# Patient Record
Sex: FEMALE | Race: WHITE | NOT HISPANIC OR LATINO | Employment: UNEMPLOYED | URBAN - METROPOLITAN AREA
[De-identification: names, ages, dates, MRNs, and addresses within clinical notes are randomized per-mention and may not be internally consistent; named-entity substitution may affect disease eponyms.]

---

## 2021-10-13 ENCOUNTER — TELEPHONE (OUTPATIENT)
Dept: SCHEDULING | Facility: IMAGING CENTER | Age: 66
End: 2021-10-13

## 2021-10-18 ENCOUNTER — OFFICE VISIT (OUTPATIENT)
Dept: MEDICAL GROUP | Facility: LAB | Age: 66
End: 2021-10-18
Payer: COMMERCIAL

## 2021-10-18 ENCOUNTER — HOSPITAL ENCOUNTER (OUTPATIENT)
Facility: MEDICAL CENTER | Age: 66
End: 2021-10-18
Attending: FAMILY MEDICINE
Payer: COMMERCIAL

## 2021-10-18 VITALS
RESPIRATION RATE: 12 BRPM | HEART RATE: 94 BPM | WEIGHT: 169 LBS | DIASTOLIC BLOOD PRESSURE: 86 MMHG | OXYGEN SATURATION: 100 % | TEMPERATURE: 97 F | HEIGHT: 66 IN | SYSTOLIC BLOOD PRESSURE: 146 MMHG | BODY MASS INDEX: 27.16 KG/M2

## 2021-10-18 DIAGNOSIS — Z12.11 COLON CANCER SCREENING: ICD-10-CM

## 2021-10-18 DIAGNOSIS — J01.30 SUBACUTE SPHENOIDAL SINUSITIS: ICD-10-CM

## 2021-10-18 DIAGNOSIS — Z11.59 NEED FOR HEPATITIS C SCREENING TEST: ICD-10-CM

## 2021-10-18 DIAGNOSIS — Z12.31 ENCOUNTER FOR SCREENING MAMMOGRAM FOR MALIGNANT NEOPLASM OF BREAST: ICD-10-CM

## 2021-10-18 DIAGNOSIS — Z13.6 ENCOUNTER FOR SCREENING FOR CARDIOVASCULAR DISORDERS: ICD-10-CM

## 2021-10-18 DIAGNOSIS — Z12.4 SCREENING FOR CERVICAL CANCER: ICD-10-CM

## 2021-10-18 DIAGNOSIS — H40.89 OTHER GLAUCOMA OF BOTH EYES: ICD-10-CM

## 2021-10-18 DIAGNOSIS — E61.1 IRON DEFICIENCY: ICD-10-CM

## 2021-10-18 PROBLEM — R03.0 ELEVATED BP WITHOUT DIAGNOSIS OF HYPERTENSION: Status: ACTIVE | Noted: 2019-10-02

## 2021-10-18 PROCEDURE — 88175 CYTOPATH C/V AUTO FLUID REDO: CPT

## 2021-10-18 PROCEDURE — 99386 PREV VISIT NEW AGE 40-64: CPT | Performed by: FAMILY MEDICINE

## 2021-10-18 PROCEDURE — 87624 HPV HI-RISK TYP POOLED RSLT: CPT

## 2021-10-18 RX ORDER — DOXYCYCLINE HYCLATE 100 MG
100 TABLET ORAL 2 TIMES DAILY
Qty: 20 TABLET | Refills: 0 | Status: SHIPPED | OUTPATIENT
Start: 2021-10-18 | End: 2021-10-28

## 2021-10-18 RX ORDER — FERROUS GLUCONATE 324(38)MG
324 TABLET ORAL
COMMUNITY
End: 2023-08-29

## 2021-10-18 RX ORDER — LORATADINE 10 MG/1
CAPSULE, LIQUID FILLED ORAL
COMMUNITY

## 2021-10-18 RX ORDER — LATANOPROST 50 UG/ML
1 SOLUTION/ DROPS OPHTHALMIC NIGHTLY
COMMUNITY
End: 2023-08-29

## 2021-10-18 ASSESSMENT — PATIENT HEALTH QUESTIONNAIRE - PHQ9: CLINICAL INTERPRETATION OF PHQ2 SCORE: 0

## 2021-10-18 NOTE — LETTER
[facility]  Jazmine Cortez M.D.  [address] [facility] [address]  [place] [address]  Fax: [phone]   Authorization for Release/Disclosure of   Protected Health Information   Name: LIZZ LOPEZ : 1955 SSN: [id number]   Address: [address] [place] [address] [address] [place] [address] Phone:    [phone] (home)    I authorize the entity listed below to release/disclose the PHI below to:   [facility]/Jazmine Cortez M.D. and Jazmine Cortez M.D.   Provider or Entity Name:  [facility]   Address   City, State, Zip   Phone:      Fax:     Reason for request: continuity of care   Information to be released:    [  ] LAST COLONOSCOPY,  including any PATH REPORT and follow-up  [  ] LAST FIT/COLOGUARD RESULT [  ] LAST DEXA  [  ] LAST MAMMOGRAM  [  ] LAST PAP  [  ] LAST LABS [  ] RETINA EXAM REPORT  [  ] IMMUNIZATION RECORDS  [ x ] Release all info      [  ] Check here and initial the line next to each item to release ALL health information INxCLUDING  __x___ Care and treatment for drug and / or alcohol abuse  __x___ HIV testing, infection status, or AIDS  __x___ Genetic Testing    DATES OF SERVICE OR TIME PERIOD TO BE DISCLOSED: _____________  I understand and acknowledge that:  * This Authorization may be revoked at any time by you in writing, except if your health information has already been used or disclosed.  * Your health information that will be used or disclosed as a result of you signing this authorization could be re-disclosed by the recipient. If this occurs, your re-disclosed health information may no longer be protected by State or Federal laws.  * You may refuse to sign this Authorization. Your refusal will not affect your ability to obtain treatment.  * This Authorization becomes effective upon signing and will  on (date) __________.      If no date is indicated, this Authorization will  one (1) year from the signature date.    Name: Lizz  Carol    Signature:   Date:     10/18/2021       PLEASE FAX REQUESTED RECORDS BACK TO: [phone]

## 2021-10-18 NOTE — PROGRESS NOTES
Chief Complaint   Patient presents with   • New Patient     no to flu         Lizz Medina is a 65 y.o. female here to establish care and for evaluation and management of:        HPI:    Has had some elevated BP in the past, without HTN diagnosis.   Not taking meds. Drove in today from [place], a bit nervous in the snow.    is planning on retiring this year, maybe. He is an .     Diet: Pretty good. Cooks a lot, 5 nights a week at least. Maybe some carbs, but not every meal. Does eat dairy. Takes Vit D.   Mom and dad  from complications from diabetes.   Exercise: walking couple miles up and down hills, peloton at home as well. Lives in [place]  Sleep: Generally good. Maybe sinus infection right now. Ear pressure some throat wheezing. Didn't sleep well last night . Takes Claritin -D once daily. H/o vertigo left side in the past, allergy related. Some vertigo last week as well. Some snoring as well of late due to sinus issues    Pap: Long time ago. No abnormals in the past.   Mammo: many years ago. No call backs.   Colonoscopy: Had one at age 50 years old.       Allergies   Allergen Reactions   • Penicillins Rash   • Latex        Current medicines (including changes today)  Current Outpatient Medications   Medication Sig Dispense Refill   • ferrous gluconate (FERGON) 324 (38 Fe) MG Tab Take 324 mg by mouth every morning with breakfast.     • latanoprost (XALATAN) 0.005 % Solution Administer 1 Drop into both eyes every evening.     • vitamin D (VITAMIND D3) 1000 UNIT Tab Take 1,000 Units by mouth every day.     • B Complex Vitamins (B COMPLEX 1 PO) Take 1 Tablet by mouth every day.     • Loratadine (CLARITIN) 10 MG Cap Take  by mouth.       No current facility-administered medications for this visit.     She  has no past medical history on file.  She  has no past surgical history on file.  Social History     Tobacco Use   • Smoking status: Never Smoker   • Smokeless tobacco: Never Used  "  Vaping Use   • Vaping Use: Never used   Substance Use Topics   • Alcohol use: Yes   • Drug use: Never     Social History     Social History Narrative   • Not on file     History reviewed. No pertinent family history.  No family status information on file.         ROS  No fever or chills.  No nausea or vomiting.  No chest pain or palpitations.  No cough or SOB.  No pain with urination or hematuria.  No black or bloody stools.  All other systems reviewed and are negative     Objective:     /86 (BP Location: Left arm, Patient Position: Sitting, BP Cuff Size: Adult)   Pulse 94   Temp 36.1 °C (97 °F)   Resp 12   Ht 1.676 m (5' 6\")   Wt 76.7 kg (169 lb)   SpO2 100%  Body mass index is 27.28 kg/m².  Physical Exam:      Well developed, well nourished.  Alert, oriented in no acute distress.  Psych: Eye contact is good, speech goal directed, affect calm  Eyes: conjunctiva non-injected, sclera non-icteric.  Ears: Pinna normal. TM pearly gray.   Nose: Nares are patent.  Normal mucosa  Mouth: Oral mucous membranes pink and moist with no lesions.  Neck Supple.  No adenopathy or masses in the neck or supraclavicular regions. No thyromegaly  Lungs: clear to auscultation bilaterally with good excursion. No wheezes or rhonchi  CV: regular rate and rhythm. No murmur  Abdomen: soft, nontender, no masses or organomegaly.  No rebound or gaurding  Ext: no edema, color normal, vascularity normal, temperature normal      Assessment and Plan:   The following treatment plan was discussed    1. Encounter for screening mammogram for malignant neoplasm of breast  Mammoordered to get caught up.  - MA-SCREENING MAMMO BILAT W/TOMOSYNTHESIS W/CAD; Future    2. Screening for cervical cancer  We will get Pap with HPV screening.  She is never had any abnormals in the past and so if this is normal this would likely be her last Pap ever.  - THINPREP PAP WITH HPV; Future    3. Colon cancer screening  She is due for colonoscopy patient has " been over 10 years.  We will get referred.  - REFERRAL TO GI FOR COLONOSCOPY    4. Encounter for screening for cardiovascular disorders  Routine screening labs ordered.  - Lipid Profile; Future  - Comp Metabolic Panel; Future    5. Iron deficiency  She does have a history of iron deficiency anemia and does not eat a lot of red meat.  We will go ahead and get some labs regarding this.  - CBC WITH DIFFERENTIAL; Future  - IRON/TOTAL IRON BIND; Future  - FERRITIN; Future    6. Need for hepatitis C screening test    - HCV Scrn ( [id number] [employer]); Future    7. Subacute sphenoidal sinusitis  We will try some doxycycline for subacute sinus infection.  She will send me a message on my chart if this is not helpful over the next couple weeks.      Records requested.        Followup: No follow-ups on file.

## 2021-10-18 NOTE — LETTER
[facility]  Jazmine Cortez M.D.  [address] [facility] [address]  [place] [address]  Fax: [phone]   Authorization for Release/Disclosure of   Protected Health Information   Name: LIZZ MEDINA : 1955 SSN: [id number]   Address: [address] [place] [address] [address] [place] [address] Phone:    [phone] (home)    I authorize the entity listed below to release/disclose the PHI below to:   [facility]/Jazmine Cortez M.D. and Jazmine Cortez M.D.   Provider or Entity Name:  Dr. delarosa   Address   City, [facility], [facility] Phone:      Fax:     Reason for request: continuity of care   Information to be released:    [  ] LAST COLONOSCOPY,  including any PATH REPORT and follow-up  [  ] LAST FIT/COLOGUARD RESULT [  ] LAST DEXA  [  ] LAST MAMMOGRAM  [  ] LAST PAP  [  ] LAST LABS [  ] RETINA EXAM REPORT  [  ] IMMUNIZATION RECORDS  [x  ] Release all info      [  ] Check here and initial the line next to each item to release ALL health information INCLUDING  __xx___ Care and treatment for drug and / or alcohol abuse  __x___ HIV testing, infection status, or AIDS  __x___ Genetic Testing    DATES OF SERVICE OR TIME PERIOD TO BE DISCLOSED: _____________  I understand and acknowledge that:  * This Authorization may be revoked at any time by you in writing, except if your health information has already been used or disclosed.  * Your health information that will be used or disclosed as a result of you signing this authorization could be re-disclosed by the recipient. If this occurs, your re-disclosed health information may no longer be protected by State or Federal laws.  * You may refuse to sign this Authorization. Your refusal will not affect your ability to obtain treatment.  * This Authorization becomes effective upon signing and will  on (date) __________.      If no date is indicated, this Authorization will  one (1) year from the signature date.    Name: Lizz Medina    Signature:   Date:      10/18/2021       PLEASE FAX REQUESTED RECORDS BACK TO: [phone]

## 2021-10-18 NOTE — LETTER
[facility] beds at the [facility]  Jazmine Cortez M.D.  [address] [facility] [address]  [place] [address]  Fax: [phone]   Authorization for Release/Disclosure of   Protected Health Information   Name: LIZZ LOPEZ : 1955 SSN: [id number]   Address: [address] [place] [address] Phone:    [phone] (home)    I authorize the entity listed below to release/disclose the PHI below to:   [facility]/Jazmine Cortez M.D. and Jazmine Cortez M.D.   Provider or Entity Name:  MABLE cook carina   Address   City, State, [facility]  Phone:      Fax:     Reason for request: continuity of care   Information to be released:    [  ] LAST COLONOSCOPY,  including any PATH REPORT and follow-up  [  ] LAST FIT/COLOGUARD RESULT [  ] LAST DEXA  [  ] LAST MAMMOGRAM  [  ] LAST PAP  [  ] LAST LABS [  ] RETINA EXAM REPORT  [  ] IMMUNIZATION RECORDS  [x  ] Release all info      [  ] Check here and initial the line next to each item to release ALL health information INCLUDING  __x___ Care and treatment for drug and / or alcohol abuse  __x___ HIV testing, infection status, or AIDS  __x___ Genetic Testing    DATES OF SERVICE OR TIME PERIOD TO BE DISCLOSED: _____________  I understand and acknowledge that:  * This Authorization may be revoked at any time by you in writing, except if your health information has already been used or disclosed.  * Your health information that will be used or disclosed as a result of you signing this authorization could be re-disclosed by the recipient. If this occurs, your re-disclosed health information may no longer be protected by State or Federal laws.  * You may refuse to sign this Authorization. Your refusal will not affect your ability to obtain treatment.  * This Authorization becomes effective upon signing and will  on (date) __________.      If no date is indicated, this Authorization will  one (1) year from the signature date.    Name: Lizz  Carol    Signature:   Date:     10/18/2021       PLEASE FAX REQUESTED RECORDS BACK TO: [phone]

## 2021-10-19 LAB
CYTOLOGY REG CYTOL: NORMAL
HPV HR 12 DNA CVX QL NAA+PROBE: NEGATIVE
HPV16 DNA SPEC QL NAA+PROBE: NEGATIVE
HPV18 DNA SPEC QL NAA+PROBE: NEGATIVE
SPECIMEN SOURCE: NORMAL

## 2021-11-10 ENCOUNTER — HOSPITAL ENCOUNTER (OUTPATIENT)
Dept: RADIOLOGY | Facility: MEDICAL CENTER | Age: 66
End: 2021-11-10
Attending: FAMILY MEDICINE
Payer: COMMERCIAL

## 2021-11-10 DIAGNOSIS — Z12.31 ENCOUNTER FOR SCREENING MAMMOGRAM FOR MALIGNANT NEOPLASM OF BREAST: ICD-10-CM

## 2021-11-10 PROCEDURE — 77063 BREAST TOMOSYNTHESIS BI: CPT

## 2021-11-19 ENCOUNTER — HOSPITAL ENCOUNTER (OUTPATIENT)
Dept: LAB | Facility: MEDICAL CENTER | Age: 66
End: 2021-11-19
Attending: FAMILY MEDICINE
Payer: COMMERCIAL

## 2021-11-19 DIAGNOSIS — Z13.6 ENCOUNTER FOR SCREENING FOR CARDIOVASCULAR DISORDERS: ICD-10-CM

## 2021-11-19 DIAGNOSIS — E61.1 IRON DEFICIENCY: ICD-10-CM

## 2021-11-19 DIAGNOSIS — Z11.59 NEED FOR HEPATITIS C SCREENING TEST: ICD-10-CM

## 2021-11-19 LAB
ALBUMIN SERPL BCP-MCNC: 4.8 G/DL (ref 3.2–4.9)
ALBUMIN/GLOB SERPL: 1.8 G/DL
ALP SERPL-CCNC: 69 U/L (ref 30–99)
ALT SERPL-CCNC: 20 U/L (ref 2–50)
ANION GAP SERPL CALC-SCNC: 10 MMOL/L (ref 7–16)
AST SERPL-CCNC: 21 U/L (ref 12–45)
BASOPHILS # BLD AUTO: 1.3 % (ref 0–1.8)
BASOPHILS # BLD: 0.07 K/UL (ref 0–0.12)
BILIRUB SERPL-MCNC: 0.8 MG/DL (ref 0.1–1.5)
BUN SERPL-MCNC: 18 MG/DL (ref 8–22)
CALCIUM SERPL-MCNC: 9.9 MG/DL (ref 8.5–10.5)
CHLORIDE SERPL-SCNC: 108 MMOL/L (ref 96–112)
CHOLEST SERPL-MCNC: 188 MG/DL (ref 100–199)
CO2 SERPL-SCNC: 25 MMOL/L (ref 20–33)
CREAT SERPL-MCNC: 0.77 MG/DL (ref 0.5–1.4)
EOSINOPHIL # BLD AUTO: 0.55 K/UL (ref 0–0.51)
EOSINOPHIL NFR BLD: 10.5 % (ref 0–6.9)
ERYTHROCYTE [DISTWIDTH] IN BLOOD BY AUTOMATED COUNT: 43.4 FL (ref 35.9–50)
FASTING STATUS PATIENT QL REPORTED: NORMAL
FERRITIN SERPL-MCNC: 128 NG/ML (ref 10–291)
GLOBULIN SER CALC-MCNC: 2.6 G/DL (ref 1.9–3.5)
GLUCOSE SERPL-MCNC: 91 MG/DL (ref 65–99)
HCT VFR BLD AUTO: 45.4 % (ref 37–47)
HCV AB SER QL: NORMAL
HDLC SERPL-MCNC: 67 MG/DL
HGB BLD-MCNC: 14.9 G/DL (ref 12–16)
IMM GRANULOCYTES # BLD AUTO: 0.01 K/UL (ref 0–0.11)
IMM GRANULOCYTES NFR BLD AUTO: 0.2 % (ref 0–0.9)
IRON SATN MFR SERPL: 36 % (ref 15–55)
IRON SERPL-MCNC: 106 UG/DL (ref 40–170)
LDLC SERPL CALC-MCNC: 102 MG/DL
LYMPHOCYTES # BLD AUTO: 2.14 K/UL (ref 1–4.8)
LYMPHOCYTES NFR BLD: 40.7 % (ref 22–41)
MCH RBC QN AUTO: 31.9 PG (ref 27–33)
MCHC RBC AUTO-ENTMCNC: 32.8 G/DL (ref 33.6–35)
MCV RBC AUTO: 97.2 FL (ref 81.4–97.8)
MONOCYTES # BLD AUTO: 0.47 K/UL (ref 0–0.85)
MONOCYTES NFR BLD AUTO: 8.9 % (ref 0–13.4)
NEUTROPHILS # BLD AUTO: 2.02 K/UL (ref 2–7.15)
NEUTROPHILS NFR BLD: 38.4 % (ref 44–72)
NRBC # BLD AUTO: 0 K/UL
NRBC BLD-RTO: 0 /100 WBC
PLATELET # BLD AUTO: 233 K/UL (ref 164–446)
PMV BLD AUTO: 10.3 FL (ref 9–12.9)
POTASSIUM SERPL-SCNC: 4.8 MMOL/L (ref 3.6–5.5)
PROT SERPL-MCNC: 7.4 G/DL (ref 6–8.2)
RBC # BLD AUTO: 4.67 M/UL (ref 4.2–5.4)
SODIUM SERPL-SCNC: 143 MMOL/L (ref 135–145)
TIBC SERPL-MCNC: 298 UG/DL (ref 250–450)
TRIGL SERPL-MCNC: 96 MG/DL (ref 0–149)
UIBC SERPL-MCNC: 192 UG/DL (ref 110–370)
WBC # BLD AUTO: 5.3 K/UL (ref 4.8–10.8)

## 2021-11-19 PROCEDURE — 80053 COMPREHEN METABOLIC PANEL: CPT

## 2021-11-19 PROCEDURE — 83550 IRON BINDING TEST: CPT

## 2021-11-19 PROCEDURE — G0472 HEP C SCREEN HIGH RISK/OTHER: HCPCS

## 2021-11-19 PROCEDURE — 80061 LIPID PANEL: CPT

## 2021-11-19 PROCEDURE — 36415 COLL VENOUS BLD VENIPUNCTURE: CPT

## 2021-11-19 PROCEDURE — 85025 COMPLETE CBC W/AUTO DIFF WBC: CPT

## 2021-11-19 PROCEDURE — 82728 ASSAY OF FERRITIN: CPT

## 2021-11-19 PROCEDURE — 83540 ASSAY OF IRON: CPT

## 2022-06-28 ENCOUNTER — PATIENT MESSAGE (OUTPATIENT)
Dept: MEDICAL GROUP | Facility: LAB | Age: 67
End: 2022-06-28
Payer: COMMERCIAL

## 2022-06-28 RX ORDER — ALBUTEROL SULFATE 90 UG/1
2 AEROSOL, METERED RESPIRATORY (INHALATION) EVERY 6 HOURS PRN
COMMUNITY
End: 2022-06-28 | Stop reason: SDUPTHER

## 2022-06-28 RX ORDER — ALBUTEROL SULFATE 90 UG/1
2 AEROSOL, METERED RESPIRATORY (INHALATION) EVERY 6 HOURS PRN
Qty: 8.5 G | Refills: 2 | Status: SHIPPED | OUTPATIENT
Start: 2022-06-28 | End: 2023-05-08 | Stop reason: SDUPTHER

## 2022-06-28 NOTE — PATIENT COMMUNICATION
Patient received this from [email or web address] and uses her inhaler when she has allergies.    Received request via: Patient    Was the patient seen in the last year in this department? Yes  10/18/2021  Does the patient have an active prescription (recently filled or refills available) for medication(s) requested? No    
No

## 2022-08-18 ENCOUNTER — HOSPITAL ENCOUNTER (OUTPATIENT)
Facility: MEDICAL CENTER | Age: 67
End: 2022-08-18
Attending: FAMILY MEDICINE
Payer: COMMERCIAL

## 2022-08-18 ENCOUNTER — OFFICE VISIT (OUTPATIENT)
Dept: MEDICAL GROUP | Facility: LAB | Age: 67
End: 2022-08-18
Payer: COMMERCIAL

## 2022-08-18 VITALS
SYSTOLIC BLOOD PRESSURE: 142 MMHG | HEIGHT: 66 IN | OXYGEN SATURATION: 94 % | WEIGHT: 163 LBS | RESPIRATION RATE: 12 BRPM | DIASTOLIC BLOOD PRESSURE: 90 MMHG | TEMPERATURE: 97 F | BODY MASS INDEX: 26.2 KG/M2

## 2022-08-18 DIAGNOSIS — J01.01 ACUTE RECURRENT MAXILLARY SINUSITIS: ICD-10-CM

## 2022-08-18 DIAGNOSIS — R03.0 ELEVATED BP WITHOUT DIAGNOSIS OF HYPERTENSION: ICD-10-CM

## 2022-08-18 PROCEDURE — 99214 OFFICE O/P EST MOD 30 MIN: CPT | Performed by: FAMILY MEDICINE

## 2022-08-18 PROCEDURE — U0003 INFECTIOUS AGENT DETECTION BY NUCLEIC ACID (DNA OR RNA); SEVERE ACUTE RESPIRATORY SYNDROME CORONAVIRUS 2 (SARS-COV-2) (CORONAVIRUS DISEASE [COVID-19]), AMPLIFIED PROBE TECHNIQUE, MAKING USE OF HIGH THROUGHPUT TECHNOLOGIES AS DESCRIBED BY CMS-2020-01-R: HCPCS

## 2022-08-18 PROCEDURE — U0005 INFEC AGEN DETEC AMPLI PROBE: HCPCS

## 2022-08-18 RX ORDER — ALBUTEROL SULFATE 2.5 MG/3ML
SOLUTION RESPIRATORY (INHALATION)
COMMUNITY
Start: 2019-10-07 | End: 2022-10-06

## 2022-08-18 RX ORDER — CEFDINIR 300 MG/1
300 CAPSULE ORAL 2 TIMES DAILY
Qty: 20 CAPSULE | Refills: 0 | Status: SHIPPED | OUTPATIENT
Start: 2022-08-18 | End: 2022-08-28

## 2022-08-18 ASSESSMENT — PATIENT HEALTH QUESTIONNAIRE - PHQ9: CLINICAL INTERPRETATION OF PHQ2 SCORE: 0

## 2022-08-18 ASSESSMENT — FIBROSIS 4 INDEX: FIB4 SCORE: 1.29

## 2022-08-18 NOTE — PROGRESS NOTES
"Subjective:     Chief Complaint   Patient presents with    Wheezing     Neg home  covid         HPI:   Lizz presents today with wheezing, cough, congestion. Negative Home test for COVID.   May have started with allergies in late spring. Does use claritin daily. Got bad in June, some days worse than others.   Some phlegm, productive cough, clearing throat a lot, not too much sore throat.   SOB on walks. Lives in [place].   Vaccinated 3/4 covid.         Current Outpatient Medications Ordered in Epic   Medication Sig Dispense Refill    albuterol (PROVENTIL) 2.5mg/3ml Nebu Soln solution for nebulization       cefdinir (OMNICEF) 300 MG Cap Take 1 Capsule by mouth 2 times a day for 10 days. 20 Capsule 0    albuterol 108 (90 Base) MCG/ACT Aero Soln inhalation aerosol Inhale 2 Puffs every 6 hours as needed for Shortness of Breath. 8.5 g 2    vitamin D (VITAMIND D3) 1000 UNIT Tab Take 1,000 Units by mouth every day.      B Complex Vitamins (B COMPLEX 1 PO) Take 1 Tablet by mouth every day.      Loratadine (CLARITIN) 10 MG Cap Take  by mouth.      ferrous gluconate (FERGON) 324 (38 Fe) MG Tab Take 324 mg by mouth every morning with breakfast.      latanoprost (XALATAN) 0.005 % Solution Administer 1 Drop into both eyes every evening.       No current Epic-ordered facility-administered medications on file.         ROS:  Gen: no fevers/chills, no changes in weight  Eyes: no changes in vision  ENT: no sore throat, no hearing loss, no bloody nose  Pulm: no sob, no cough  CV: no chest pain, no palpitations  GI: no nausea/vomiting, no diarrhea  : no dysuria  MSk: no myalgias  Skin: no rash  Neuro: no headaches, no numbness/tingling  Heme/Lymph: no easy bruising      Objective:     Exam:  BP (!) 142/90   Temp 36.1 °C (97 °F)   Resp 12   Ht 1.676 m (5' 6\")   Wt 73.9 kg (163 lb)   SpO2 94%   BMI 26.31 kg/m²  Body mass index is 26.31 kg/m².    Gen: Alert and oriented, No apparent distress.  Neck: Neck is supple without " lymphadenopathy.  Lungs: Normal effort, CTA bilaterally, no wheezes, rhonchi, or rales. Productive cough in clinic.   CV: Regular rate and rhythm. No murmurs, rubs, or gallops.  Ext: No clubbing, cyanosis, edema.      Assessment & Plan:     66 y.o. female with the following -     1. Acute recurrent maxillary sinusitis  Discussed likelihood of sinus infection but since she just traveled on Tuesday of this week we will go ahead and get her screened for COVID as well.  Send out test will be sent in and she will get the results to her MyChart immediately.  In the event this is negative we will definitely go ahead and start treating her sinuses with some cefdinir.  May add some prednisone in the future depending upon how she does.  If she has another recurrent sinus infection fairly soon after this 1 may consider CT imaging and ENT referral.  - SARS-CoV-2 PCR (24 hour In-House): Collect NP swab in VTM; Future  - cefdinir (OMNICEF) 300 MG Cap; Take 1 Capsule by mouth 2 times a day for 10 days.  Dispense: 20 Capsule; Refill: 0    2.  Elevated blood pressure without diagnosis of hypertension  I think this is mostly this infection that she has going on right now.  Should we see her back on a routine wellness visit and her blood pressure still remains elevated may consider low-dose treatment.        No follow-ups on file.    Please note that this dictation was created using voice recognition software. I have made every reasonable attempt to correct obvious errors, but I expect that there are errors of grammar and possibly content that I did not discover before finalizing the note.

## 2022-08-19 DIAGNOSIS — J01.01 ACUTE RECURRENT MAXILLARY SINUSITIS: ICD-10-CM

## 2022-08-19 LAB
COVID ORDER STATUS COVID19: NORMAL
SARS-COV-2 RNA RESP QL NAA+PROBE: NOTDETECTED
SPECIMEN SOURCE: NORMAL

## 2022-10-06 ENCOUNTER — OFFICE VISIT (OUTPATIENT)
Dept: MEDICAL GROUP | Facility: LAB | Age: 67
End: 2022-10-06
Payer: COMMERCIAL

## 2022-10-06 VITALS
WEIGHT: 158 LBS | TEMPERATURE: 97.4 F | DIASTOLIC BLOOD PRESSURE: 76 MMHG | SYSTOLIC BLOOD PRESSURE: 150 MMHG | HEART RATE: 100 BPM | RESPIRATION RATE: 12 BRPM | HEIGHT: 66 IN | OXYGEN SATURATION: 96 % | BODY MASS INDEX: 25.39 KG/M2

## 2022-10-06 DIAGNOSIS — J45.30 MILD PERSISTENT REACTIVE AIRWAY DISEASE WITHOUT COMPLICATION: ICD-10-CM

## 2022-10-06 PROCEDURE — 99213 OFFICE O/P EST LOW 20 MIN: CPT | Performed by: FAMILY MEDICINE

## 2022-10-06 RX ORDER — PREDNISONE 20 MG/1
TABLET ORAL
Qty: 18 TABLET | Refills: 0 | Status: SHIPPED | OUTPATIENT
Start: 2022-10-06 | End: 2022-11-22 | Stop reason: SDUPTHER

## 2022-10-06 ASSESSMENT — FIBROSIS 4 INDEX: FIB4 SCORE: 1.29

## 2022-10-06 NOTE — PROGRESS NOTES
"Subjective:     Chief Complaint   Patient presents with    Sinus Problem     On and off for 5 years          HPI:   Lizz presents today with continued sinus issues, feeling of throat closing or narrowing a bit. Treated with cefdinir in august. Still coughing, though did initially feel better after antibiotics.   Coughing is brought on by activity.   She does audibly hear some wheezing sometimes, at the end of a breath, and feels like shes going to start coughing.         Current Outpatient Medications Ordered in Epic   Medication Sig Dispense Refill    albuterol (PROVENTIL) 2.5mg/3ml Nebu Soln solution for nebulization       albuterol 108 (90 Base) MCG/ACT Aero Soln inhalation aerosol Inhale 2 Puffs every 6 hours as needed for Shortness of Breath. 8.5 g 2    vitamin D (VITAMIND D3) 1000 UNIT Tab Take 1,000 Units by mouth every day.      B Complex Vitamins (B COMPLEX 1 PO) Take 1 Tablet by mouth every day.      Loratadine (CLARITIN) 10 MG Cap Take  by mouth.      ferrous gluconate (FERGON) 324 (38 Fe) MG Tab Take 324 mg by mouth every morning with breakfast.      latanoprost (XALATAN) 0.005 % Solution Administer 1 Drop into both eyes every evening.       No current Epic-ordered facility-administered medications on file.         ROS:  Gen: no fevers/chills, no changes in weight  Eyes: no changes in vision  ENT: no sore throat, no hearing loss, no bloody nose  Pulm: no sob, no cough  CV: no chest pain, no palpitations  GI: no nausea/vomiting, no diarrhea  : no dysuria  MSk: no myalgias  Skin: no rash  Neuro: no headaches, no numbness/tingling  Heme/Lymph: no easy bruising      Objective:     Exam:  BP (!) 150/76 (BP Location: Right arm, Patient Position: Sitting, BP Cuff Size: Adult)   Pulse 100   Temp 36.3 °C (97.4 °F)   Resp 12   Ht 1.676 m (5' 6\")   Wt 71.7 kg (158 lb)   SpO2 96%   BMI 25.50 kg/m²  Body mass index is 25.5 kg/m².    Gen: Alert and oriented, No apparent distress.  Neck: Neck is supple " without lymphadenopathy.  Lungs: Normal effort, CTA bilaterally, mild wheezes bilaterally to lower lung fields, end expiratory, no rhonchi, or rales  CV: Regular rate and rhythm. No murmurs, rubs, or gallops.  Ext: No clubbing, cyanosis, edema.      Assessment & Plan:     66 y.o. female with the following -     1. Mild persistent reactive airway disease without complication  Discussed that suspect she has more of a reactive airway type issue than gil sinusitis or bronchitis.  She also describes difficulty swallowing pills so there may be an underlying esophageal issue as well.  We will go ahead and try a prednisone course to see how she does and what kind of relief she gets from this.  May then consider pulmonary function test if it looks like she continues with reactivity or if once off prednisone her symptoms return.  Did discuss risks and benefits of medication also possible side effects.  - predniSONE (DELTASONE) 20 MG Tab; 3 tabs daily for 3 days,  2 tabs daily for 3 days, then 1 tab daily for 3 days.  Dispense: 18 Tablet; Refill: 0            No follow-ups on file.    Please note that this dictation was created using voice recognition software. I have made every reasonable attempt to correct obvious errors, but I expect that there are errors of grammar and possibly content that I did not discover before finalizing the note.

## 2022-11-22 ENCOUNTER — PATIENT MESSAGE (OUTPATIENT)
Dept: MEDICAL GROUP | Facility: LAB | Age: 67
End: 2022-11-22
Payer: MEDICARE

## 2022-11-22 DIAGNOSIS — J45.30 MILD PERSISTENT REACTIVE AIRWAY DISEASE WITHOUT COMPLICATION: ICD-10-CM

## 2022-11-22 RX ORDER — PREDNISONE 20 MG/1
TABLET ORAL
Qty: 18 TABLET | Refills: 0 | Status: SHIPPED
Start: 2022-11-22 | End: 2023-08-29

## 2022-11-29 ENCOUNTER — OFFICE VISIT (OUTPATIENT)
Dept: MEDICAL GROUP | Facility: LAB | Age: 67
End: 2022-11-29
Payer: MEDICARE

## 2022-11-29 VITALS
HEIGHT: 66 IN | SYSTOLIC BLOOD PRESSURE: 160 MMHG | TEMPERATURE: 97.2 F | BODY MASS INDEX: 27.16 KG/M2 | OXYGEN SATURATION: 96 % | WEIGHT: 169 LBS | HEART RATE: 97 BPM | RESPIRATION RATE: 12 BRPM | DIASTOLIC BLOOD PRESSURE: 90 MMHG

## 2022-11-29 DIAGNOSIS — R06.02 SOB (SHORTNESS OF BREATH): ICD-10-CM

## 2022-11-29 DIAGNOSIS — R03.0 ELEVATED BP WITHOUT DIAGNOSIS OF HYPERTENSION: ICD-10-CM

## 2022-11-29 PROCEDURE — 99214 OFFICE O/P EST MOD 30 MIN: CPT | Performed by: FAMILY MEDICINE

## 2022-11-29 RX ORDER — FLUTICASONE PROPIONATE 44 UG/1
2 AEROSOL, METERED RESPIRATORY (INHALATION) 2 TIMES DAILY
Qty: 1 EACH | Refills: 3 | Status: SHIPPED | OUTPATIENT
Start: 2022-11-29 | End: 2023-05-08

## 2022-11-29 ASSESSMENT — FIBROSIS 4 INDEX: FIB4 SCORE: 1.31

## 2022-11-29 NOTE — PROGRESS NOTES
Subjective:     Chief Complaint   Patient presents with    Breathing Problem         HPI:   Lizz presents today with recurrent difficulty breathing, throat closing feeling. Treated with steroid burst and did better, then it seemed to come back.   Second round of prednisone helped as well. Less phlegm, cough better.   Cold air is a trigger.  Still comes on when she wants to go outside for a walk.  Has been much improved on the prednisone the second time around as well.  Never been on a controller inhaler in the past. Albuterol inhaler helps within minutes, feels more relaxed.   No other PFTs or asthma testing otherwise.           Current Outpatient Medications Ordered in Epic   Medication Sig Dispense Refill    predniSONE (DELTASONE) 20 MG Tab 3 tabs daily for 3 days,  2 tabs daily for 3 days, then 1 tab daily for 3 days. 18 Tablet 0    albuterol 108 (90 Base) MCG/ACT Aero Soln inhalation aerosol Inhale 2 Puffs every 6 hours as needed for Shortness of Breath. 8.5 g 2    vitamin D (VITAMIND D3) 1000 UNIT Tab Take 1,000 Units by mouth every day.      B Complex Vitamins (B COMPLEX 1 PO) Take 1 Tablet by mouth every day.      Loratadine (CLARITIN) 10 MG Cap Take  by mouth.      ferrous gluconate (FERGON) 324 (38 Fe) MG Tab Take 324 mg by mouth every morning with breakfast.      latanoprost (XALATAN) 0.005 % Solution Administer 1 Drop into both eyes every evening.       No current Epic-ordered facility-administered medications on file.         ROS:  Gen: no fevers/chills, no changes in weight  Eyes: no changes in vision  ENT: no sore throat, no hearing loss, no bloody nose  Pulm: no sob, no cough  CV: no chest pain, no palpitations  GI: no nausea/vomiting, no diarrhea  : no dysuria  MSk: no myalgias  Skin: no rash  Neuro: no headaches, no numbness/tingling  Heme/Lymph: no easy bruising      Objective:     Exam:  BP (!) 160/90 (BP Location: Left arm, Patient Position: Sitting, BP Cuff Size: Adult)   Pulse 97   Temp  "36.2 °C (97.2 °F)   Resp 12   Ht 1.676 m (5' 6\")   Wt 76.7 kg (169 lb)   SpO2 96%   BMI 27.28 kg/m²  Body mass index is 27.28 kg/m².    Gen: Alert and oriented, No apparent distress.  Neck: Neck is supple without lymphadenopathy.  Lungs: Normal effort, CTA bilaterally, no wheezes, rhonchi, or rales  CV: Regular rate and rhythm. No murmurs, rubs, or gallops.  Ext: No clubbing, cyanosis, edema.      Assessment & Plan:     67 y.o. female with the following -     1. SOB (shortness of breath)  Like to go ahead and get pulmonary function testing.  I did send in a prescription for Flovent as well.  If she finishes the steroid taper and symptoms start to return I want her to trial the daily steroid inhaler.  Given that symptoms seem to get much better with prednisone it does seem that this is inflammatory or lung related not necessarily cardiac related is that likely would not get better with prednisone.  - PULMONARY FUNCTION TESTS -Test requested: Complete Pulmonary Function Test; Future  - fluticasone (FLOVENT HFA) 44 MCG/ACT Aerosol; Inhale 2 Puffs 2 times a day.  Dispense: 1 Each; Refill: 3    2. Elevated BP without diagnosis of hypertension  She has been on a couple rounds of prednisone and so this could be artificially pumping up her blood pressure but I like her to get a home blood pressure cuff at home and recheck this.  She will send me a log in the next week or so so we can decide if we need to better treat the blood pressure in the interim.  If this is still fairly high we may consider also stress testing given her symptoms.          No follow-ups on file.    Please note that this dictation was created using voice recognition software. I have made every reasonable attempt to correct obvious errors, but I expect that there are errors of grammar and possibly content that I did not discover before finalizing the note.        "

## 2022-12-28 ENCOUNTER — TELEPHONE (OUTPATIENT)
Dept: MEDICAL GROUP | Facility: LAB | Age: 67
End: 2022-12-28
Payer: MEDICARE

## 2022-12-28 NOTE — TELEPHONE ENCOUNTER
Colorectal Care Gap Outreach    1. Confirmed patient is between the ages of 50-75: YES     2. Confirmed that patient IS overdue or due soon for colorectal cancer screening: YES     3. Were orders placed within the last 12 months to complete screening: NO     Phone Number Called: [phone]  Call outcome: left message for patient to call back regarding message below: lvm to let us know if had colonoscopy or Cologuard test in the last ten years or three.    _____________________________________________________________________    Colon Cancer Screening Guidelines:     Important: If patient has any history of colon polyps or family history of colorectal cancer, FIT and Cologuard are NOT appropriate options. A colonoscopy is the recommended test for this set of patients.    Colonoscopy  Always recommend colonoscopy first.   A colonoscopy is recommended over the other tests because it provides direct visualization of the colon and if there are small polyps these can also be removed with one procedure.  If negative and no family history, could be cleared for 10 years.     Cologuard/FIT  Cologuard is completed once every 3 years.  FIT is completed annually.  If positive, Cologuard and FIT will require a diagnostic colonoscopy. Screening colonoscopies are classically covered by insurances, however, diagnostic colonoscopies may result in a bill.

## 2023-01-26 ENCOUNTER — TELEPHONE (OUTPATIENT)
Dept: MEDICAL GROUP | Facility: LAB | Age: 68
End: 2023-01-26
Payer: MEDICARE

## 2023-01-26 NOTE — TELEPHONE ENCOUNTER
Colorectal Care Gap Outreach    1. Confirmed patient is between the ages of 50-75: YES     2. Confirmed that patient IS overdue or due soon for colorectal cancer screening: YES     3. Were orders placed within the last 12 months to complete screening: NO     Phone Number Called: [phone]  Call outcome: left message for patient to call back regarding message below: lvm to see if has had colonoscopy in last 10 years.     _____________________________________________________________________    Colon Cancer Screening Guidelines:     Important: If patient has any history of colon polyps or family history of colorectal cancer, FIT and Cologuard are NOT appropriate options. A colonoscopy is the recommended test for this set of patients.    Colonoscopy  Always recommend colonoscopy first.   A colonoscopy is recommended over the other tests because it provides direct visualization of the colon and if there are small polyps these can also be removed with one procedure.  If negative and no family history, could be cleared for 10 years.     Cologuard/FIT  Cologuard is completed once every 3 years.  FIT is completed annually.  If positive, Cologuard and FIT will require a diagnostic colonoscopy. Screening colonoscopies are classically covered by insurances, however, diagnostic colonoscopies may result in a bill.

## 2023-02-23 ENCOUNTER — APPOINTMENT (OUTPATIENT)
Dept: PULMONOLOGY | Facility: MEDICAL CENTER | Age: 68
End: 2023-02-23
Attending: FAMILY MEDICINE
Payer: MEDICARE

## 2023-03-07 ENCOUNTER — APPOINTMENT (OUTPATIENT)
Dept: PULMONOLOGY | Facility: MEDICAL CENTER | Age: 68
End: 2023-03-07
Attending: FAMILY MEDICINE
Payer: MEDICARE

## 2023-03-21 ENCOUNTER — HOSPITAL ENCOUNTER (OUTPATIENT)
Dept: PULMONOLOGY | Facility: MEDICAL CENTER | Age: 68
End: 2023-03-21
Attending: FAMILY MEDICINE
Payer: MEDICARE

## 2023-03-21 DIAGNOSIS — R06.02 SOB (SHORTNESS OF BREATH): ICD-10-CM

## 2023-04-24 ENCOUNTER — TELEPHONE (OUTPATIENT)
Dept: HEALTH INFORMATION MANAGEMENT | Facility: OTHER | Age: 68
End: 2023-04-24

## 2023-05-08 DIAGNOSIS — R06.02 SOB (SHORTNESS OF BREATH): ICD-10-CM

## 2023-05-08 RX ORDER — FLUTICASONE PROPIONATE 44 MCG
AEROSOL WITH ADAPTER (GRAM) INHALATION
Qty: 10.6 G | Refills: 3 | Status: SHIPPED | OUTPATIENT
Start: 2023-05-08 | End: 2023-05-08 | Stop reason: SDUPTHER

## 2023-05-09 RX ORDER — FLUTICASONE PROPIONATE 44 UG/1
2 AEROSOL, METERED RESPIRATORY (INHALATION) 2 TIMES DAILY
Qty: 10.6 G | Refills: 3 | Status: SHIPPED | OUTPATIENT
Start: 2023-05-09 | End: 2023-10-05

## 2023-05-09 RX ORDER — ALBUTEROL SULFATE 90 UG/1
2 AEROSOL, METERED RESPIRATORY (INHALATION) EVERY 6 HOURS PRN
Qty: 8.5 G | Refills: 2 | Status: SHIPPED | OUTPATIENT
Start: 2023-05-09

## 2023-05-09 NOTE — TELEPHONE ENCOUNTER
Received request via: Pharmacy  11/29/22lov  Was the patient seen in the last year in this department? Yes    Does the patient have an active prescription (recently filled or refills available) for medication(s) requested? No    Does the patient have senior living Plus and need 100 day supply (blood pressure, diabetes and cholesterol meds only)? Patient does not have SCP

## 2023-06-09 ENCOUNTER — HOSPITAL ENCOUNTER (OUTPATIENT)
Dept: PULMONOLOGY | Facility: MEDICAL CENTER | Age: 68
End: 2023-06-09
Attending: FAMILY MEDICINE
Payer: MEDICARE

## 2023-06-09 PROCEDURE — 94729 DIFFUSING CAPACITY: CPT

## 2023-06-09 PROCEDURE — 94060 EVALUATION OF WHEEZING: CPT | Mod: 26 | Performed by: STUDENT IN AN ORGANIZED HEALTH CARE EDUCATION/TRAINING PROGRAM

## 2023-06-09 PROCEDURE — 94729 DIFFUSING CAPACITY: CPT | Mod: 26 | Performed by: STUDENT IN AN ORGANIZED HEALTH CARE EDUCATION/TRAINING PROGRAM

## 2023-06-09 PROCEDURE — 94060 EVALUATION OF WHEEZING: CPT

## 2023-06-09 PROCEDURE — 94726 PLETHYSMOGRAPHY LUNG VOLUMES: CPT | Mod: 26 | Performed by: STUDENT IN AN ORGANIZED HEALTH CARE EDUCATION/TRAINING PROGRAM

## 2023-06-09 PROCEDURE — 94726 PLETHYSMOGRAPHY LUNG VOLUMES: CPT

## 2023-06-09 RX ADMIN — Medication 2.5 MG: at 09:52

## 2023-06-09 ASSESSMENT — PULMONARY FUNCTION TESTS
FEV1/FVC_PERCENT_CHANGE: 3
FEV1/FVC: 73
FEV1_PREDICTED: 2.51
FEV1/FVC: 73
FEV1: 2.69
FVC_PERCENT_PREDICTED: 114
FVC: 3.71
FVC: 3.68
FEV1/FVC: 75.74
FEV1/FVC_PERCENT_PREDICTED: 96
FEV1_PERCENT_CHANGE: 4
FVC_LLN: 32.69
FEV1_PERCENT_CHANGE: 0
FEV1_LLN: 2.10
FEV1/FVC: 76
FEV1_PERCENT_PREDICTED: 107
FVC_PERCENT_PREDICTED: 114
FEV1/FVC_PERCENT_PREDICTED: 78
FVC_LLN: 2.69
FEV1: 2.81
FVC_PREDICTED: 3.23
FEV1_LLN: 2.10
FEV1_PERCENT_PREDICTED: 111
FEV1/FVC_PREDICTED: 78
FEV1/FVC_PERCENT_PREDICTED: 93
FEV1/FVC_PERCENT_LLN: 66
FEV1/FVC_PERCENT_PREDICTED: 94
FEV1/FVC_PERCENT_LLN: 66
FEV1/FVC_PERCENT_PREDICTED: 97

## 2023-06-11 NOTE — PROCEDURES
DATE OF SERVICE:  06/09/2023     PULMONARY FUNCTION TEST INTERPRETATION     There is no significant obstruction or restriction.  No significant   bronchodilator response.  Normal diffusion capacity.        ______________________________  MD MERVAT Frye/RASHMI    DD:  06/11/2023 10:46  DT:  06/11/2023 13:14    Job#:  [id number]

## 2023-06-27 ENCOUNTER — TELEPHONE (OUTPATIENT)
Dept: MEDICAL GROUP | Facility: LAB | Age: 68
End: 2023-06-27
Payer: MEDICARE

## 2023-06-27 NOTE — TELEPHONE ENCOUNTER
Colorectal Care Gap Outreach    1. Confirmed patient is between the ages of 50-75: YES     2. Confirmed that patient IS overdue or due soon for colorectal cancer screening: YES     3. Were orders placed within the last 12 months to complete screening: NO     Phone Number Called: [phone]  Call outcome: Patient plans to schedule appointment. I scheduled Lizz for AWV in August with Dr. Colunga and will talk with her then about colonoscopy/cologuard test and other care gaps.     _____________________________________________________________________    Colon Cancer Screening Guidelines:     Important: If patient has any history of colon polyps or family history of colorectal cancer, FIT and Cologuard are NOT appropriate options. A colonoscopy is the recommended test for this set of patients.    Colonoscopy  Always recommend colonoscopy first.   A colonoscopy is recommended over the other tests because it provides direct visualization of the colon and if there are small polyps these can also be removed with one procedure.  If negative and no family history, could be cleared for 10 years.     Cologuard/FIT  Cologuard is completed once every 3 years.  FIT is completed annually.  If positive, Cologuard and FIT will require a diagnostic colonoscopy. Screening colonoscopies are classically covered by insurances, however, diagnostic colonoscopies may result in a bill.

## 2023-08-22 ENCOUNTER — TELEPHONE (OUTPATIENT)
Dept: MEDICAL GROUP | Facility: LAB | Age: 68
End: 2023-08-22
Payer: MEDICARE

## 2023-08-22 NOTE — TELEPHONE ENCOUNTER
ANNUAL WELLNESS VISIT PRE-VISIT PLANNING    1.  Reviewed notes from the last office visit: Yes    2.  If any orders were ordered or intended to be done prior to visit (i.e. 6 mos follow-up), do we have results/consult notes or has patient scheduled?          Labs - Labs were not ordered at last office visit.  Note: If patient appointment is for lab review and patient did not complete labs, check with provider if OK to reschedule patient until labs completed.         Imaging - Imaging was not ordered at last office visit.         Referrals - No referrals were ordered at last office visit.    3.  Immunizations were updated in Epic using Reconcile Outside Information activity? Yes    4.  Patient is due for the following Health Maintenance Topics:   Health Maintenance Due   Topic Date Due    Bone Density Scan  Never done    IMM DTaP/Tdap/Td Vaccine (1 - Tdap) Never done    Colorectal Cancer Screening  Never done    Zoster (Shingles) Vaccines (1 of 2) Never done    COVID-19 Vaccine (4 - Pfizer series) 03/08/2022    Annual Wellness Visit  Never done    Pneumococcal Vaccine: 65+ Years (1 - PCV) Never done     5.  Reviewed/Updated the following with patient:          Preferred Pharmacy? Yes          Preferred Lab? Yes          Preferred Communication? Yes          Allergies? Yes          Medications? YES. Was Abstract Encounter opened and chart updated? YES    6.  Care Team Updated:          DME Company (gait device, O2, CPAP, etc.): N\A          Other Specialists (eye doctor, derm, GYN, cardiology, endo, etc): YES    7.  Patient was advised: “This is a free wellness visit. The provider will screen for medical conditions to help you stay healthy. If you have other concerns to address you may be asked to discuss these at a separate visit or there may be an additional fee.”     8.  AHA (Puls8) form printed for Provider? N/A

## 2023-08-27 SDOH — ECONOMIC STABILITY: INCOME INSECURITY: HOW HARD IS IT FOR YOU TO PAY FOR THE VERY BASICS LIKE FOOD, HOUSING, MEDICAL CARE, AND HEATING?: NOT HARD AT ALL

## 2023-08-27 SDOH — ECONOMIC STABILITY: HOUSING INSECURITY: IN THE LAST 12 MONTHS, HOW MANY PLACES HAVE YOU LIVED?: 1

## 2023-08-27 SDOH — ECONOMIC STABILITY: FOOD INSECURITY: WITHIN THE PAST 12 MONTHS, THE FOOD YOU BOUGHT JUST DIDN'T LAST AND YOU DIDN'T HAVE MONEY TO GET MORE.: NEVER TRUE

## 2023-08-27 SDOH — ECONOMIC STABILITY: INCOME INSECURITY: IN THE LAST 12 MONTHS, WAS THERE A TIME WHEN YOU WERE NOT ABLE TO PAY THE MORTGAGE OR RENT ON TIME?: NO

## 2023-08-27 SDOH — ECONOMIC STABILITY: HOUSING INSECURITY
IN THE LAST 12 MONTHS, WAS THERE A TIME WHEN YOU DID NOT HAVE A STEADY PLACE TO SLEEP OR SLEPT IN A SHELTER (INCLUDING NOW)?: NO

## 2023-08-27 SDOH — HEALTH STABILITY: MENTAL HEALTH
STRESS IS WHEN SOMEONE FEELS TENSE, NERVOUS, ANXIOUS, OR CAN'T SLEEP AT NIGHT BECAUSE THEIR MIND IS TROUBLED. HOW STRESSED ARE YOU?: NOT AT ALL

## 2023-08-27 SDOH — HEALTH STABILITY: PHYSICAL HEALTH: ON AVERAGE, HOW MANY MINUTES DO YOU ENGAGE IN EXERCISE AT THIS LEVEL?: 70 MIN

## 2023-08-27 SDOH — ECONOMIC STABILITY: TRANSPORTATION INSECURITY
IN THE PAST 12 MONTHS, HAS LACK OF RELIABLE TRANSPORTATION KEPT YOU FROM MEDICAL APPOINTMENTS, MEETINGS, WORK OR FROM GETTING THINGS NEEDED FOR DAILY LIVING?: NO

## 2023-08-27 SDOH — ECONOMIC STABILITY: FOOD INSECURITY: WITHIN THE PAST 12 MONTHS, YOU WORRIED THAT YOUR FOOD WOULD RUN OUT BEFORE YOU GOT MONEY TO BUY MORE.: NEVER TRUE

## 2023-08-27 SDOH — HEALTH STABILITY: PHYSICAL HEALTH: ON AVERAGE, HOW MANY DAYS PER WEEK DO YOU ENGAGE IN MODERATE TO STRENUOUS EXERCISE (LIKE A BRISK WALK)?: 7 DAYS

## 2023-08-27 SDOH — ECONOMIC STABILITY: TRANSPORTATION INSECURITY
IN THE PAST 12 MONTHS, HAS THE LACK OF TRANSPORTATION KEPT YOU FROM MEDICAL APPOINTMENTS OR FROM GETTING MEDICATIONS?: NO

## 2023-08-27 SDOH — ECONOMIC STABILITY: TRANSPORTATION INSECURITY
IN THE PAST 12 MONTHS, HAS LACK OF TRANSPORTATION KEPT YOU FROM MEETINGS, WORK, OR FROM GETTING THINGS NEEDED FOR DAILY LIVING?: NO

## 2023-08-27 ASSESSMENT — LIFESTYLE VARIABLES
SKIP TO QUESTIONS 9-10: 1
HOW MANY STANDARD DRINKS CONTAINING ALCOHOL DO YOU HAVE ON A TYPICAL DAY: 1 OR 2
HOW OFTEN DO YOU HAVE A DRINK CONTAINING ALCOHOL: 2-4 TIMES A MONTH
HOW OFTEN DO YOU HAVE SIX OR MORE DRINKS ON ONE OCCASION: NEVER
AUDIT-C TOTAL SCORE: 2

## 2023-08-27 ASSESSMENT — SOCIAL DETERMINANTS OF HEALTH (SDOH)
HOW OFTEN DO YOU GET TOGETHER WITH FRIENDS OR RELATIVES?: TWICE A WEEK
DO YOU BELONG TO ANY CLUBS OR ORGANIZATIONS SUCH AS CHURCH GROUPS UNIONS, FRATERNAL OR ATHLETIC GROUPS, OR SCHOOL GROUPS?: YES
HOW OFTEN DO YOU ATTEND CHURCH OR RELIGIOUS SERVICES?: PATIENT DECLINED
HOW OFTEN DO YOU ATTENT MEETINGS OF THE CLUB OR ORGANIZATION YOU BELONG TO?: MORE THAN 4 TIMES PER YEAR
HOW OFTEN DO YOU ATTEND CHURCH OR RELIGIOUS SERVICES?: PATIENT DECLINED
IN A TYPICAL WEEK, HOW MANY TIMES DO YOU TALK ON THE PHONE WITH FAMILY, FRIENDS, OR NEIGHBORS?: MORE THAN THREE TIMES A WEEK
HOW OFTEN DO YOU HAVE SIX OR MORE DRINKS ON ONE OCCASION: NEVER
HOW MANY DRINKS CONTAINING ALCOHOL DO YOU HAVE ON A TYPICAL DAY WHEN YOU ARE DRINKING: 1 OR 2
IN A TYPICAL WEEK, HOW MANY TIMES DO YOU TALK ON THE PHONE WITH FAMILY, FRIENDS, OR NEIGHBORS?: MORE THAN THREE TIMES A WEEK
WITHIN THE PAST 12 MONTHS, YOU WORRIED THAT YOUR FOOD WOULD RUN OUT BEFORE YOU GOT THE MONEY TO BUY MORE: NEVER TRUE
DO YOU BELONG TO ANY CLUBS OR ORGANIZATIONS SUCH AS CHURCH GROUPS UNIONS, FRATERNAL OR ATHLETIC GROUPS, OR SCHOOL GROUPS?: YES
HOW HARD IS IT FOR YOU TO PAY FOR THE VERY BASICS LIKE FOOD, HOUSING, MEDICAL CARE, AND HEATING?: NOT HARD AT ALL
HOW OFTEN DO YOU ATTENT MEETINGS OF THE CLUB OR ORGANIZATION YOU BELONG TO?: MORE THAN 4 TIMES PER YEAR
HOW OFTEN DO YOU HAVE A DRINK CONTAINING ALCOHOL: 2-4 TIMES A MONTH
HOW OFTEN DO YOU GET TOGETHER WITH FRIENDS OR RELATIVES?: TWICE A WEEK

## 2023-08-29 ENCOUNTER — OFFICE VISIT (OUTPATIENT)
Dept: MEDICAL GROUP | Facility: LAB | Age: 68
End: 2023-08-29
Payer: MEDICARE

## 2023-08-29 VITALS
HEART RATE: 94 BPM | OXYGEN SATURATION: 95 % | HEIGHT: 66 IN | RESPIRATION RATE: 12 BRPM | TEMPERATURE: 97.5 F | WEIGHT: 169 LBS | BODY MASS INDEX: 27.16 KG/M2 | SYSTOLIC BLOOD PRESSURE: 130 MMHG | DIASTOLIC BLOOD PRESSURE: 70 MMHG

## 2023-08-29 DIAGNOSIS — E55.9 VITAMIN D DEFICIENCY: ICD-10-CM

## 2023-08-29 DIAGNOSIS — Z23 NEED FOR VACCINATION: ICD-10-CM

## 2023-08-29 DIAGNOSIS — Z00.00 ENCOUNTER FOR MEDICARE ANNUAL WELLNESS EXAM: ICD-10-CM

## 2023-08-29 DIAGNOSIS — M81.0 POSTMENOPAUSAL BONE LOSS: ICD-10-CM

## 2023-08-29 DIAGNOSIS — R03.0 ELEVATED BP WITHOUT DIAGNOSIS OF HYPERTENSION: ICD-10-CM

## 2023-08-29 DIAGNOSIS — Z12.11 COLON CANCER SCREENING: ICD-10-CM

## 2023-08-29 DIAGNOSIS — Z12.31 ENCOUNTER FOR SCREENING MAMMOGRAM FOR MALIGNANT NEOPLASM OF BREAST: ICD-10-CM

## 2023-08-29 DIAGNOSIS — E78.00 ELEVATED LDL CHOLESTEROL LEVEL: ICD-10-CM

## 2023-08-29 DIAGNOSIS — R73.09 ELEVATED GLUCOSE: ICD-10-CM

## 2023-08-29 PROCEDURE — 3078F DIAST BP <80 MM HG: CPT | Performed by: FAMILY MEDICINE

## 2023-08-29 PROCEDURE — 90471 IMMUNIZATION ADMIN: CPT | Performed by: FAMILY MEDICINE

## 2023-08-29 PROCEDURE — 90715 TDAP VACCINE 7 YRS/> IM: CPT | Performed by: FAMILY MEDICINE

## 2023-08-29 PROCEDURE — 3075F SYST BP GE 130 - 139MM HG: CPT | Performed by: FAMILY MEDICINE

## 2023-08-29 PROCEDURE — G0438 PPPS, INITIAL VISIT: HCPCS | Mod: 25 | Performed by: FAMILY MEDICINE

## 2023-08-29 RX ORDER — CYCLOSPORINE 0.5 MG/ML
EMULSION OPHTHALMIC
COMMUNITY
Start: 2023-06-22

## 2023-08-29 RX ORDER — LATANOPROSTENE BUNOD 0.24 MG/ML
SOLUTION/ DROPS OPHTHALMIC
COMMUNITY
Start: 2023-08-04

## 2023-08-29 ASSESSMENT — FIBROSIS 4 INDEX: FIB4 SCORE: 1.31

## 2023-08-29 ASSESSMENT — ENCOUNTER SYMPTOMS: GENERAL WELL-BEING: EXCELLENT

## 2023-08-29 ASSESSMENT — ACTIVITIES OF DAILY LIVING (ADL): BATHING_REQUIRES_ASSISTANCE: 0

## 2023-08-29 ASSESSMENT — PATIENT HEALTH QUESTIONNAIRE - PHQ9: CLINICAL INTERPRETATION OF PHQ2 SCORE: 0

## 2023-08-29 NOTE — PROGRESS NOTES
Chief Complaint   Patient presents with    Annual Exam       HPI:  Lizz is a 67 y.o. here for Medicare Annual Wellness Visit  Having cataracts replaced in [place].   Diet: Lean proteins, lots of veggies. Lots of water. Cocktail if going out. Soda water.   Exercise: few miles daily up and down hills, peloton and bikes.   Sleep: good. 8-9 hours nightly.     Mammo: due  DEXA:  due  Pap: 2021 normal cotesting, no prior abnormals.   Colon: Due.     Patient Active Problem List    Diagnosis Date Noted    Other specified glaucoma 10/18/2021    Elevated BP without diagnosis of hypertension 10/02/2019       Current Outpatient Medications   Medication Sig Dispense Refill    VYZULTA 0.024 % Solution       RESTASIS 0.05 % ophthalmic emulsion       cycloSPORINE (RESTASIS OP) Administer  into affected eye(s).      albuterol 108 (90 Base) MCG/ACT Aero Soln inhalation aerosol Inhale 2 Puffs every 6 hours as needed for Shortness of Breath. 8.5 g 2    fluticasone (FLOVENT HFA) 44 MCG/ACT Aerosol Inhale 2 Puffs 2 times a day. 10.6 g 3    vitamin D (VITAMIND D3) 1000 UNIT Tab Take 1,000 Units by mouth every day.      B Complex Vitamins (B COMPLEX 1 PO) Take 1 Tablet by mouth every day.      Loratadine (CLARITIN) 10 MG Cap Take  by mouth.       No current facility-administered medications for this visit.        Patient is taking medications as noted in medication list.  Current supplements as per medication list.     Allergies: Penicillins and Latex    Current social contact/activities: active often.      Is patient current with immunizations? Yes.    She  reports that she has never smoked. She has never used smokeless tobacco. She reports current alcohol use. She reports that she does not use drugs.  Counseling given: Not Answered      ROS:    Gait: Uses no assistive device   Ostomy: No   Other tubes: No   Amputations: No   Chronic oxygen use No   Last eye exam  2023  Wears hearing aids: No   : Denies any urinary leakage during  the last 6 months    Screening:    Depression Screening  Little interest or pleasure in doing things?  0 - not at all  Feeling down, depressed, or hopeless? 0 - not at all  Patient Health Questionnaire Score: 0    If depressive symptoms identified deferred to follow up visit unless specifically addressed in assessment and plan.    Interpretation of PHQ-9 Total Score   Score Severity   1-4 No Depression   5-9 Mild Depression   10-14 Moderate Depression   15-19 Moderately Severe Depression   20-27 Severe Depression    Screening for Cognitive Impairment  Three Minute Recall (daughter, heaven, mountain)  3/3    Marty clock face with all 12 numbers and set the hands to show 10 past 11.  Yes    If cognitive concerns identified, deferred for follow up unless specifically addressed in assessment and plan.    Fall Risk Assessment  Has the patient had two or more falls in the last year or any fall with injury in the last year?  No  If fall risk identified, deferred for follow up unless specifically addressed in assessment and plan.    Safety Assessment  Throw rugs on floor.   no  Handrails on all stairs.   yes  Good lighting in all hallways.   yes  Difficulty hearing.  No  Patient counseled about all safety risks that were identified.    Functional Assessment ADLs  Are there any barriers preventing you from cooking for yourself or meeting nutritional needs?  No.    Are there any barriers preventing you from driving safely or obtaining transportation?  No.    Are there any barriers preventing you from using a telephone or calling for help?  No.    Are there any barriers preventing you from shopping?  No.    Are there any barriers preventing you from taking care of your own finances?  No.    Are there any barriers preventing you from managing your medications?  No.    Are there any barriers preventing you from showering, bathing or dressing yourself?  No.    Are you currently engaging in any exercise or physical activity?  Yes.      What is your perception of your health?  Excellent.    Advance Care Planning  Do you have an Advance Directive, Living Will, Durable Power of , or POLST? Yes               Health Maintenance Summary            Ordered - Bone Density Scan (Every 5 Years) Ordered on 2023      No completion history exists for this topic.              Ordered - IMM DTaP/Tdap/Td Vaccine (1 - Tdap) Ordered on 2023      No completion history exists for this topic.              Overdue - Colorectal Cancer Screening (Colonoscopy - Every 10 Years) Overdue - never done      No completion history exists for this topic.              Overdue - Zoster (Shingles) Vaccines (1 of 2) Overdue - never done      No completion history exists for this topic.              Overdue - COVID-19 Vaccine (4 - Pfizer series) Overdue since 3/8/2022      2022  Imm Admin: MODERNA SARS-COV-2 VACCINE (12+)    2021  Imm Admin: PFIZER PURPLE CAP SARS-COV-2 VACCINATION (12+)    2021  Imm Admin: PFIZER PURPLE CAP SARS-COV-2 VACCINATION (12+)              Ordered - Mammogram (Yearly) Ordered on 2023      11/10/2021  MA-SCREENING MAMMO BILAT W/IMPLANTS W/KOSTA W/CAD              Postponed - Pneumococcal Vaccine: 65+ Years (1 - PCV) Postponed until 2024      No completion history exists for this topic.              Influenza Vaccine (1) Next due on 2023      No completion history exists for this topic.              Cervical Cancer Screening (Every 3 Years) Next due on 10/18/2024      10/18/2021  Pathology Gynecology Specimen    10/18/2021  THINPREP PAP WITH HPV              Hepatitis C Screening  Tentatively Complete      2021  Hepatitis C Antibody component of HCV Scrn ( 9420-9868 1xLife)              Annual Wellness Visit  Completed      2023  Visit Dx: Encounter for Medicare annual wellness exam              Hepatitis A Vaccine (Hep A) (Series Information) Aged Out      No completion history exists for  "this topic.              HPV Vaccines (Series Information) Aged Out      No completion history exists for this topic.              Polio Vaccine (Inactivated Polio) (Series Information) Aged Out      No completion history exists for this topic.              IMM MENINGOCOCCAL ACWY VACCINE (Series Information) Aged Out      No completion history exists for this topic.              Discontinued - Hepatitis B Vaccine (Hep B)  Discontinued      No completion history exists for this topic.                    Patient Care Team:  Jazmine Reyna M.D. as PCP - General (Family Medicine)  Omer Bailey M.D. (Ophthalmology)    Social History     Tobacco Use    Smoking status: Never    Smokeless tobacco: Never   Vaping Use    Vaping Use: Never used   Substance Use Topics    Alcohol use: Yes    Drug use: Never     No family history on file.  She  has no past medical history on file.   No past surgical history on file.    Exam:   /70 (BP Location: Left arm, Patient Position: Sitting, BP Cuff Size: Adult)   Pulse 94   Temp 36.4 °C (97.5 °F)   Resp 12   Ht 1.676 m (5' 6\")   Wt 76.7 kg (169 lb)   SpO2 95%  Body mass index is 27.28 kg/m².    Hearing excellent.    Dentition good  Alert, oriented in no acute distress.  Eye contact is good, speech goal directed, affect calm  RRR, CTAB.   No edema.     Assessment and Plan. The following treatment and monitoring plan is recommended:    1. Encounter for Medicare annual wellness exam    2. Postmenopausal bone loss  - DS-BONE DENSITY STUDY (DEXA); Future    3. Encounter for screening mammogram for malignant neoplasm of breast  - MA-SCREENING MAMMO BILAT W/TOMOSYNTHESIS W/CAD; Future    4. Colon cancer screening  - Referral to Gastroenterology    5. Need for vaccination  - Tdap Vaccine =>8YO IM    6. Elevated BP without diagnosis of hypertension  - TSH WITH REFLEX TO FT4; Future    7. Vitamin D deficiency  - VITAMIN D,25 HYDROXY (DEFICIENCY); Future    8. Elevated " glucose  - Comp Metabolic Panel; Future  - HEMOGLOBIN A1C; Future    9. Elevated LDL cholesterol level  - Lipid Profile; Future    Other orders  - VYZULTA 0.024 % Solution  - RESTASIS 0.05 % ophthalmic emulsion       Services suggested: No services needed at this time  Health Care Screening recommendations as per orders if indicated.  Referrals offered: PT/OT/Nutrition counseling/Behavioral Health/Smoking cessation as per orders if indicated.    Discussion today about general wellness and lifestyle habits:    Prevent falls and reduce trip hazards; Cautioned about securing or removing rugs.  Have a working fire alarm and carbon monoxide detector;   Engage in regular physical activity and social activities     Follow-up: No follow-ups on file.

## 2023-10-05 DIAGNOSIS — R06.02 SOB (SHORTNESS OF BREATH): ICD-10-CM

## 2023-10-05 RX ORDER — FLUTICASONE PROPIONATE 44 MCG
2 AEROSOL WITH ADAPTER (GRAM) INHALATION 2 TIMES DAILY
Qty: 10.6 G | Refills: 3 | Status: SHIPPED | OUTPATIENT
Start: 2023-10-05 | End: 2024-03-15 | Stop reason: SDUPTHER

## 2023-10-05 NOTE — TELEPHONE ENCOUNTER
Received request via: Pharmacy    Was the patient seen in the last year in this department? Yes  LOV : 8/29/2023   Does the patient have an active prescription (recently filled or refills available) for medication(s) requested? No    Does the patient have group home Plus and need 100 day supply (blood pressure, diabetes and cholesterol meds only)?

## 2024-02-08 ENCOUNTER — PATIENT MESSAGE (OUTPATIENT)
Dept: MEDICAL GROUP | Facility: LAB | Age: 69
End: 2024-02-08
Payer: MEDICARE

## 2024-03-15 DIAGNOSIS — R06.02 SOB (SHORTNESS OF BREATH): ICD-10-CM

## 2024-03-15 NOTE — TELEPHONE ENCOUNTER
Received request via: Patient    Was the patient seen in the last year in this department? Yes    Does the patient have an active prescription (recently filled or refills available) for medication(s) requested? No    [facility]    Does the patient have [facility] and need 100 day supply (blood pressure, diabetes and cholesterol meds only)? Medication is not for cholesterol, blood pressure or diabetes

## 2024-03-17 RX ORDER — FLUTICASONE PROPIONATE 44 UG/1
2 AEROSOL, METERED RESPIRATORY (INHALATION) 2 TIMES DAILY
Qty: 10.6 G | Refills: 0 | Status: SHIPPED | OUTPATIENT
Start: 2024-03-17

## 2024-04-22 DIAGNOSIS — R06.02 SOB (SHORTNESS OF BREATH): ICD-10-CM

## 2024-04-22 RX ORDER — FLUTICASONE PROPIONATE 44 UG/1
2 AEROSOL, METERED RESPIRATORY (INHALATION) 2 TIMES DAILY
Qty: 10.6 G | Refills: 0 | Status: SHIPPED | OUTPATIENT
Start: 2024-04-22

## 2024-04-22 NOTE — TELEPHONE ENCOUNTER
Received request via: Pharmacy    Was the patient seen in the last year in this department? Yes    Does the patient have an active prescription (recently filled or refills available) for medication(s) requested? No    [facility]    Does the patient have [facility] Plus and need 100 day supply (blood pressure, diabetes and cholesterol meds only)? Medication is not for cholesterol, blood pressure or diabetes

## 2024-05-06 ENCOUNTER — TELEPHONE (OUTPATIENT)
Dept: HEALTH INFORMATION MANAGEMENT | Facility: OTHER | Age: 69
End: 2024-05-06

## 2024-05-20 DIAGNOSIS — R06.02 SOB (SHORTNESS OF BREATH): ICD-10-CM

## 2024-05-20 NOTE — TELEPHONE ENCOUNTER
Received request via: Patient    Was the patient seen in the last year in this department? Yes    Does the patient have an active prescription (recently filled or refills available) for medication(s) requested? No    [facility]    Does the patient have group home Plus and need 100 day supply (blood pressure, diabetes and cholesterol meds only)? Medication is not for cholesterol, blood pressure or diabetes

## 2024-05-21 RX ORDER — FLUTICASONE PROPIONATE 44 UG/1
2 AEROSOL, METERED RESPIRATORY (INHALATION) 2 TIMES DAILY
Qty: 10.6 G | Refills: 0 | Status: SHIPPED | OUTPATIENT
Start: 2024-05-21

## 2024-06-25 ENCOUNTER — HOSPITAL ENCOUNTER (OUTPATIENT)
Dept: RADIOLOGY | Facility: MEDICAL CENTER | Age: 69
End: 2024-06-25
Attending: FAMILY MEDICINE
Payer: MEDICARE

## 2024-06-25 ENCOUNTER — HOSPITAL ENCOUNTER (OUTPATIENT)
Dept: LAB | Facility: MEDICAL CENTER | Age: 69
End: 2024-06-25
Attending: FAMILY MEDICINE
Payer: MEDICARE

## 2024-06-25 DIAGNOSIS — E78.00 ELEVATED LDL CHOLESTEROL LEVEL: ICD-10-CM

## 2024-06-25 DIAGNOSIS — R03.0 ELEVATED BP WITHOUT DIAGNOSIS OF HYPERTENSION: ICD-10-CM

## 2024-06-25 DIAGNOSIS — R73.09 ELEVATED GLUCOSE: ICD-10-CM

## 2024-06-25 DIAGNOSIS — E55.9 VITAMIN D DEFICIENCY: ICD-10-CM

## 2024-06-25 LAB
25(OH)D3 SERPL-MCNC: 62 NG/ML (ref 30–100)
ALBUMIN SERPL BCP-MCNC: 4.2 G/DL (ref 3.2–4.9)
ALBUMIN/GLOB SERPL: 1.5 G/DL
ALP SERPL-CCNC: 73 U/L (ref 30–99)
ALT SERPL-CCNC: 24 U/L (ref 2–50)
ANION GAP SERPL CALC-SCNC: 9 MMOL/L (ref 7–16)
AST SERPL-CCNC: 24 U/L (ref 12–45)
BILIRUB SERPL-MCNC: 0.9 MG/DL (ref 0.1–1.5)
BUN SERPL-MCNC: 23 MG/DL (ref 8–22)
CALCIUM ALBUM COR SERPL-MCNC: 9.4 MG/DL (ref 8.5–10.5)
CALCIUM SERPL-MCNC: 9.6 MG/DL (ref 8.5–10.5)
CHLORIDE SERPL-SCNC: 105 MMOL/L (ref 96–112)
CHOLEST SERPL-MCNC: 217 MG/DL (ref 100–199)
CO2 SERPL-SCNC: 25 MMOL/L (ref 20–33)
CREAT SERPL-MCNC: 0.8 MG/DL (ref 0.5–1.4)
EST. AVERAGE GLUCOSE BLD GHB EST-MCNC: 108 MG/DL
GFR SERPLBLD CREATININE-BSD FMLA CKD-EPI: 81 ML/MIN/1.73 M 2
GLOBULIN SER CALC-MCNC: 2.8 G/DL (ref 1.9–3.5)
GLUCOSE SERPL-MCNC: 90 MG/DL (ref 65–99)
HBA1C MFR BLD: 5.4 % (ref 4–5.6)
HDLC SERPL-MCNC: 63 MG/DL
LDLC SERPL CALC-MCNC: 119 MG/DL
POTASSIUM SERPL-SCNC: 5 MMOL/L (ref 3.6–5.5)
PROT SERPL-MCNC: 7 G/DL (ref 6–8.2)
SODIUM SERPL-SCNC: 139 MMOL/L (ref 135–145)
TRIGL SERPL-MCNC: 173 MG/DL (ref 0–149)
TSH SERPL DL<=0.005 MIU/L-ACNC: 1.01 UIU/ML (ref 0.38–5.33)

## 2024-06-25 PROCEDURE — 80061 LIPID PANEL: CPT

## 2024-06-25 PROCEDURE — 36415 COLL VENOUS BLD VENIPUNCTURE: CPT

## 2024-06-25 PROCEDURE — 80053 COMPREHEN METABOLIC PANEL: CPT

## 2024-06-25 PROCEDURE — 84443 ASSAY THYROID STIM HORMONE: CPT

## 2024-06-25 PROCEDURE — 83036 HEMOGLOBIN GLYCOSYLATED A1C: CPT

## 2024-06-25 PROCEDURE — 82306 VITAMIN D 25 HYDROXY: CPT

## 2024-07-17 DIAGNOSIS — R06.02 SOB (SHORTNESS OF BREATH): ICD-10-CM

## 2024-07-18 RX ORDER — FLUTICASONE PROPIONATE 44 UG/1
2 AEROSOL, METERED RESPIRATORY (INHALATION) 2 TIMES DAILY
Qty: 10.6 G | Refills: 0 | Status: SHIPPED | OUTPATIENT
Start: 2024-07-18

## 2024-08-06 ENCOUNTER — HOSPITAL ENCOUNTER (OUTPATIENT)
Dept: RADIOLOGY | Facility: MEDICAL CENTER | Age: 69
End: 2024-08-06
Attending: FAMILY MEDICINE
Payer: MEDICARE

## 2024-08-06 DIAGNOSIS — M81.0 POSTMENOPAUSAL BONE LOSS: ICD-10-CM

## 2024-08-06 PROCEDURE — 77080 DXA BONE DENSITY AXIAL: CPT

## 2024-09-09 ENCOUNTER — PATIENT MESSAGE (OUTPATIENT)
Dept: MEDICAL GROUP | Facility: LAB | Age: 69
End: 2024-09-09
Payer: MEDICARE

## 2024-09-16 RX ORDER — ALBUTEROL SULFATE 90 UG/1
2 INHALANT RESPIRATORY (INHALATION) EVERY 6 HOURS PRN
Qty: 6.7 G | Refills: 2 | Status: SHIPPED | OUTPATIENT
Start: 2024-09-16

## 2024-09-16 NOTE — TELEPHONE ENCOUNTER
Received request via: Pharmacy    Was the patient seen in the last year in this department? No 8/29/2023    Does the patient have an active prescription (recently filled or refills available) for medication(s) requested? No    [facility]    Does the patient have [facility] and need 100-day supply? (This applies to ALL medications)

## 2024-10-03 ENCOUNTER — PATIENT MESSAGE (OUTPATIENT)
Dept: HEALTH INFORMATION MANAGEMENT | Facility: OTHER | Age: 69
End: 2024-10-03

## 2024-10-10 ENCOUNTER — PATIENT MESSAGE (OUTPATIENT)
Dept: HEALTH INFORMATION MANAGEMENT | Facility: OTHER | Age: 69
End: 2024-10-10

## 2025-01-05 DIAGNOSIS — R06.02 SOB (SHORTNESS OF BREATH): ICD-10-CM

## 2025-01-06 RX ORDER — FLUTICASONE PROPIONATE 44 UG/1
2 AEROSOL, METERED RESPIRATORY (INHALATION) 2 TIMES DAILY
Qty: 10.6 G | Refills: 0 | Status: SHIPPED | OUTPATIENT
Start: 2025-01-06

## 2025-02-01 DIAGNOSIS — R06.02 SOB (SHORTNESS OF BREATH): ICD-10-CM

## 2025-02-03 RX ORDER — FLUTICASONE PROPIONATE 44 UG/1
2 AEROSOL, METERED RESPIRATORY (INHALATION) 2 TIMES DAILY
Qty: 10.6 G | Refills: 0 | Status: SHIPPED
Start: 2025-02-03 | End: 2025-02-07

## 2025-03-20 ENCOUNTER — PATIENT MESSAGE (OUTPATIENT)
Dept: HEALTH INFORMATION MANAGEMENT | Facility: OTHER | Age: 70
End: 2025-03-20

## 2025-05-10 SDOH — HEALTH STABILITY: PHYSICAL HEALTH: ON AVERAGE, HOW MANY MINUTES DO YOU ENGAGE IN EXERCISE AT THIS LEVEL?: 40 MIN

## 2025-05-10 SDOH — HEALTH STABILITY: PHYSICAL HEALTH: ON AVERAGE, HOW MANY DAYS PER WEEK DO YOU ENGAGE IN MODERATE TO STRENUOUS EXERCISE (LIKE A BRISK WALK)?: 7 DAYS

## 2025-05-10 SDOH — ECONOMIC STABILITY: FOOD INSECURITY: WITHIN THE PAST 12 MONTHS, YOU WORRIED THAT YOUR FOOD WOULD RUN OUT BEFORE YOU GOT MONEY TO BUY MORE.: NEVER TRUE

## 2025-05-10 SDOH — ECONOMIC STABILITY: FOOD INSECURITY: WITHIN THE PAST 12 MONTHS, THE FOOD YOU BOUGHT JUST DIDN'T LAST AND YOU DIDN'T HAVE MONEY TO GET MORE.: NEVER TRUE

## 2025-05-10 SDOH — ECONOMIC STABILITY: INCOME INSECURITY: IN THE LAST 12 MONTHS, WAS THERE A TIME WHEN YOU WERE NOT ABLE TO PAY THE MORTGAGE OR RENT ON TIME?: NO

## 2025-05-10 SDOH — ECONOMIC STABILITY: INCOME INSECURITY: HOW HARD IS IT FOR YOU TO PAY FOR THE VERY BASICS LIKE FOOD, HOUSING, MEDICAL CARE, AND HEATING?: NOT HARD AT ALL

## 2025-05-10 ASSESSMENT — SOCIAL DETERMINANTS OF HEALTH (SDOH)
HOW OFTEN DO YOU ATTENT MEETINGS OF THE CLUB OR ORGANIZATION YOU BELONG TO?: NEVER
HOW OFTEN DO YOU HAVE SIX OR MORE DRINKS ON ONE OCCASION: NEVER
DO YOU BELONG TO ANY CLUBS OR ORGANIZATIONS SUCH AS CHURCH GROUPS UNIONS, FRATERNAL OR ATHLETIC GROUPS, OR SCHOOL GROUPS?: NO
IN A TYPICAL WEEK, HOW MANY TIMES DO YOU TALK ON THE PHONE WITH FAMILY, FRIENDS, OR NEIGHBORS?: MORE THAN THREE TIMES A WEEK
IN THE PAST 12 MONTHS, HAS THE ELECTRIC, GAS, OIL, OR WATER COMPANY THREATENED TO SHUT OFF SERVICE IN YOUR HOME?: NO
HOW OFTEN DO YOU GET TOGETHER WITH FRIENDS OR RELATIVES?: MORE THAN THREE TIMES A WEEK
HOW OFTEN DO YOU GET TOGETHER WITH FRIENDS OR RELATIVES?: MORE THAN THREE TIMES A WEEK
HOW OFTEN DO YOU HAVE A DRINK CONTAINING ALCOHOL: 2-3 TIMES A WEEK
HOW HARD IS IT FOR YOU TO PAY FOR THE VERY BASICS LIKE FOOD, HOUSING, MEDICAL CARE, AND HEATING?: NOT HARD AT ALL
WITHIN THE PAST 12 MONTHS, YOU WORRIED THAT YOUR FOOD WOULD RUN OUT BEFORE YOU GOT THE MONEY TO BUY MORE: NEVER TRUE
HOW OFTEN DO YOU ATTEND CHURCH OR RELIGIOUS SERVICES?: 1 TO 4 TIMES PER YEAR
HOW OFTEN DO YOU ATTENT MEETINGS OF THE CLUB OR ORGANIZATION YOU BELONG TO?: NEVER
HOW MANY DRINKS CONTAINING ALCOHOL DO YOU HAVE ON A TYPICAL DAY WHEN YOU ARE DRINKING: 1 OR 2
DO YOU BELONG TO ANY CLUBS OR ORGANIZATIONS SUCH AS CHURCH GROUPS UNIONS, FRATERNAL OR ATHLETIC GROUPS, OR SCHOOL GROUPS?: NO
IN A TYPICAL WEEK, HOW MANY TIMES DO YOU TALK ON THE PHONE WITH FAMILY, FRIENDS, OR NEIGHBORS?: MORE THAN THREE TIMES A WEEK
HOW OFTEN DO YOU ATTEND CHURCH OR RELIGIOUS SERVICES?: 1 TO 4 TIMES PER YEAR

## 2025-05-10 ASSESSMENT — LIFESTYLE VARIABLES
AUDIT-C TOTAL SCORE: 3
HOW OFTEN DO YOU HAVE A DRINK CONTAINING ALCOHOL: 2-3 TIMES A WEEK
SKIP TO QUESTIONS 9-10: 1
HOW OFTEN DO YOU HAVE SIX OR MORE DRINKS ON ONE OCCASION: NEVER
HOW MANY STANDARD DRINKS CONTAINING ALCOHOL DO YOU HAVE ON A TYPICAL DAY: 1 OR 2

## 2025-05-13 ENCOUNTER — APPOINTMENT (OUTPATIENT)
Dept: MEDICAL GROUP | Facility: LAB | Age: 70
End: 2025-05-13
Payer: MEDICARE

## 2025-05-13 VITALS
WEIGHT: 171 LBS | BODY MASS INDEX: 28.49 KG/M2 | TEMPERATURE: 97.1 F | SYSTOLIC BLOOD PRESSURE: 130 MMHG | DIASTOLIC BLOOD PRESSURE: 84 MMHG | RESPIRATION RATE: 16 BRPM | HEART RATE: 88 BPM | OXYGEN SATURATION: 97 % | HEIGHT: 65 IN

## 2025-05-13 DIAGNOSIS — Z12.31 ENCOUNTER FOR SCREENING MAMMOGRAM FOR MALIGNANT NEOPLASM OF BREAST: ICD-10-CM

## 2025-05-13 DIAGNOSIS — Z00.00 ENCOUNTER FOR MEDICARE ANNUAL WELLNESS EXAM: ICD-10-CM

## 2025-05-13 DIAGNOSIS — E78.00 ELEVATED LDL CHOLESTEROL LEVEL: ICD-10-CM

## 2025-05-13 DIAGNOSIS — E55.9 VITAMIN D DEFICIENCY: ICD-10-CM

## 2025-05-13 DIAGNOSIS — R73.09 ELEVATED GLUCOSE: ICD-10-CM

## 2025-05-13 DIAGNOSIS — J45.30 MILD PERSISTENT ASTHMA WITHOUT COMPLICATION: ICD-10-CM

## 2025-05-13 PROCEDURE — 3075F SYST BP GE 130 - 139MM HG: CPT | Performed by: FAMILY MEDICINE

## 2025-05-13 PROCEDURE — 3079F DIAST BP 80-89 MM HG: CPT | Performed by: FAMILY MEDICINE

## 2025-05-13 PROCEDURE — G0439 PPPS, SUBSEQ VISIT: HCPCS | Performed by: FAMILY MEDICINE

## 2025-05-13 ASSESSMENT — PATIENT HEALTH QUESTIONNAIRE - PHQ9: CLINICAL INTERPRETATION OF PHQ2 SCORE: 0

## 2025-05-13 ASSESSMENT — ACTIVITIES OF DAILY LIVING (ADL): BATHING_REQUIRES_ASSISTANCE: 0

## 2025-05-13 ASSESSMENT — ENCOUNTER SYMPTOMS: GENERAL WELL-BEING: EXCELLENT

## 2025-05-13 NOTE — PROGRESS NOTES
Chief Complaint   Patient presents with    Medicare Annual Wellness       HPI:  Lizz Medina is a 69 y.o. here for Medicare Annual Wellness Visit     Diet: cut back some cheese, rich foods. 4-5 drinks per week, 2 Friday 2 saturday when out to dinner.   Exercise: Jazzercise 3 days per week.   Sleep: good, no concerns.   Using Qvar daily. Helpful. Very rarely rescue inhaler, claritin D generic.   Mammo: 2021 on file.   DEXa: 2024 normal   Colonoscopy: cologuard in 2023.   Dental: regular.    Vision: cataracts done, this is good.   No derm.     Declines pneumo vax.   Patient Active Problem List    Diagnosis Date Noted    Other specified glaucoma 10/18/2021    Elevated BP without diagnosis of hypertension 10/02/2019       Current Outpatient Medications   Medication Sig Dispense Refill    beclomethasone HFA (QVAR REDIHALER) 40 MCG/ACT inhaler Inhale 1 Puff 2 times a day. 10.6 g 3    albuterol 108 (90 Base) MCG/ACT Aero Soln inhalation aerosol INHALE TWO PUFFS BY MOUTH EVERY 6 HOURS AS NEEDED FOR SHORTNESS OF BREATH 6.7 g 2    loratadine-pseudoephedrine (CLARITIN-D 24-HOUR)  MG per tablet Take 1 Tablet by mouth every day. 30 Tablet 1    VYZULTA 0.024 % Solution       RESTASIS 0.05 % ophthalmic emulsion       vitamin D (VITAMIND D3) 1000 UNIT Tab Take 1,000 Units by mouth every day.      B Complex Vitamins (B COMPLEX 1 PO) Take 1 Tablet by mouth every day.       No current facility-administered medications for this visit.          Current supplements as per medication list.     Allergies: Penicillins and Latex    Current social contact/activities: play cards, exercise class, dinner with neighbors      She  reports that she has never smoked. She has never used smokeless tobacco. She reports current alcohol use of about 2.4 oz of alcohol per week. She reports that she does not use drugs.  Counseling given: Not Answered      ROS:    Gait: Uses no assistive device  Ostomy: No  Other tubes: No  Amputations:  No  Chronic oxygen use: No  Last eye exam: 09/2024  Wears hearing aids: No   : Denies any urinary leakage during the last 6 months    Screening:    Depression Screening  Little interest or pleasure in doing things?  0 - not at all  Feeling down, depressed , or hopeless? 0 - not at all  Patient Health Questionnaire Score: 0     If depressive symptoms identified deferred to follow up visit unless specifically addressed in assessment and plan.    Interpretation of PHQ-9 Total Score   Score Severity   1-4 No Depression   5-9 Mild Depression   10-14 Moderate Depression   15-19 Moderately Severe Depression   20-27 Severe Depression    Screening for Cognitive Impairment  Do you or any of your friends or family members have any concern about your memory? No  Three Minute Recall (Village, Kitchen, Baby) 3/3    Marty clock face with all 12 numbers and set the hands to show 10 minutes past 11.  Yes    Cognitive concerns identified deferred for follow up unless specifically addressed in assessment and plan.    Fall Risk Assessment  Has the patient had two or more falls in the last year or any fall with injury in the last year?  Yes    Safety Assessment  Do you always wear your seatbelt?  Yes  Any changes to home needed to function safely? No  Difficulty hearing.  No  Patient counseled about all safety risks that were identified.    Functional Assessment ADLs  Are there any barriers preventing you from cooking for yourself or meeting nutritional needs?  No.    Are there any barriers preventing you from driving safely or obtaining transportation?  No.    Are there any barriers preventing you from using a telephone or calling for help?  No    Are there any barriers preventing you from shopping?  No.    Are there any barriers preventing you from taking care of your own finances?  No    Are there any barriers preventing you from managing your medications?  No    Are there any barriers preventing you from showering, bathing or  dressing yourself? No    Are there any barriers preventing you from doing housework or laundry? No  Are there any barriers preventing you from using the toilet?No  Are you currently engaging in any exercise or physical activity?  Yes.      Self-Assessment of Health  What is your perception of your health? Excellent    Do you sleep more than six hours a night? Yes    In the past 7 days, how much did pain keep you from doing your normal work? None    Do you spend quality time with family or friends (virtually or in person)? Yes    Do you usually eat a heart healthy diet that constists of a variety of fruits, vegetables, whole grains and fiber? Yes    Do you eat foods high in fat and/or Fast Food more than three times per week? No    How concerned are you that your medical conditions are not being well managed? Not at all    Are you worried that in the next 2 months, you may not have stable housing that you own, rent, or stay in as part of a household? No      Advance Care Planning  Do you have an Advance Directive, Living Will, Durable Power of , or POLST? Yes  Advance Directive       is not on file - instructed patient to bring in a copy to scan into their chart      Health Maintenance Summary            Current Care Gaps       COVID-19 Vaccine (2024- season) Overdue since 2022  Imm Admin: MODERNA SARS-COV-2 VACCINE (12+)    2021  Imm Admin: PFIZER PURPLE CAP SARS-COV-2 VACCINATION (12+)    2021  Imm Admin: PFIZER PURPLE CAP SARS-COV-2 VACCINATION (12+)              Zoster (Shingles) Vaccines (1 of 2) Never done     No completion history exists for this topic.              Pneumococcal Vaccine: 50+ Years (1 of 1 - PCV) Never done     No completion history exists for this topic.                      Needs Review       Hepatitis C Screening  Tentatively Complete      2021  Hepatitis C Antibody component of HCV Scrn ( [id number]              Colorectal  Cancer Screening (Colon Cancer Screening Cologuard Stool (FIT DNA) - Every 3 Years) Tentatively due on 11/8/2026 11/08/2023  COLOGUARD RESULT component of COLOGUARD COLON CANCER SCREENING    11/08/2023  COLOGUARD COLON CANCER SCREENING                      Awaiting Completion       Mammogram (Yearly) Order placed this encounter      05/13/2025  Order placed for MA-SCREENING MAMMO BILAT W/TOMOSYNTHESIS W/CAD by Jazmine Reyna M.D.    11/10/2021  MA-SCREENING MAMMO BILAT W/IMPLANTS W/KOSTA W/CAD                      Upcoming       Influenza Vaccine (Season Ended) Next due on 9/1/2025     No completion history exists for this topic.              Annual Wellness Visit (Yearly) Next due on 5/13/2026 05/13/2025  Visit Dx: Encounter for Medicare annual wellness exam    08/29/2023  Visit Dx: Encounter for Medicare annual wellness exam              Bone Density Scan (Every 5 Years) Next due on 8/6/2029 08/06/2024  DS-BONE DENSITY STUDY (DEXA)              IMM DTaP/Tdap/Td Vaccine (2 - Td or Tdap) Next due on 8/29/2033 08/29/2023  Imm Admin: Tdap Vaccine                      Completed or No Longer Recommended       Hepatitis A Vaccine (Hep A) (Series Information) Aged Out     No completion history exists for this topic.              HPV Vaccines (Series Information) Aged Out     No completion history exists for this topic.              Polio Vaccine (Inactivated Polio) (Series Information) Aged Out     No completion history exists for this topic.              Meningococcal Immunization (Series Information) Aged Out     No completion history exists for this topic.              Cervical Cancer Screening  Discontinued        Frequency changed to Never automatically (Topic No Longer Applies)    10/18/2021  THINPREP PAP WITH HPV    10/18/2021  Pathology Gynecology Specimen              Hepatitis B Vaccine (Hep B)  Discontinued     No completion history exists for this topic.                       "      Patient Care Team:  Jazmine Reyna M.D. as PCP - General (Family Medicine)  Omer Bailey M.D. (Ophthalmology)        Social History     Tobacco Use    Smoking status: Never    Smokeless tobacco: Never   Vaping Use    Vaping status: Never Used   Substance Use Topics    Alcohol use: Yes     Alcohol/week: 2.4 oz     Types: 4 Standard drinks or equivalent per week    Drug use: Never     Family History   Problem Relation Age of Onset    Diabetes Mother     Diabetes Father      She  has a past medical history of Allergy and Glaucoma.   Past Surgical History:   Procedure Laterality Date    EYE SURGERY      LUMPECTOMY      NC BREAST AUGMENTATION WITH IMPLANT  2004    Dr Anshu Salomon, [place], [place]    TUBAL COAGULATION LAPAROSCOPIC BILATERAL         Exam:   /84   Pulse 88   Temp 36.2 °C (97.1 °F)   Resp 16   Ht 1.651 m (5' 5\")   Wt 77.6 kg (171 lb)   SpO2 97%  Body mass index is 28.46 kg/m².    Hearing good.    Dentition good  Alert, oriented in no acute distress.  Eye contact is good, speech goal directed, affect calm  RRR, CTAB.   No edema.     Assessment and Plan. The following treatment and monitoring plan is recommended:    1. Encounter for Medicare annual wellness exam    2. Encounter for screening mammogram for malignant neoplasm of breast  - MA-SCREENING MAMMO BILAT W/TOMOSYNTHESIS W/CAD; Future    3. Vitamin D deficiency  - VITAMIN D,25 HYDROXY (DEFICIENCY); Future    4. Elevated glucose  - Comp Metabolic Panel; Future  - HEMOGLOBIN A1C; Future    5. Elevated LDL cholesterol level  - Lipid Profile; Future  - TSH WITH REFLEX TO FT4; Future    6. Mild persistent asthma without complication  - CBC WITH DIFFERENTIAL; Future  She is doing quite well overall.  We did discuss the [facility] which she will schedule at her convenience.  Will get some updated labs for further risk assessment.  She is due for mammogram and she will do that at her leisure.    Services suggested: " No services needed at this time  Health Care Screening: Age-appropriate preventive services recommended by USPTF and ACIP covered by Medicare were discussed today. Services ordered if indicated and agreed upon by the patient.  Referrals offered: Community-based lifestyle interventions to reduce health risks and promote self-management and wellness, fall prevention, nutrition, physical activity, tobacco-use cessation, weight loss, and mental health services as per orders if indicated.    Discussion today about general wellness and lifestyle habits:    Prevent falls and reduce trip hazards; Cautioned about securing or removing rugs.  Have a working fire alarm and carbon monoxide detector;   Engage in regular physical activity and social activities     Follow-up: No follow-ups on file.

## 2025-06-13 ENCOUNTER — TELEPHONE (OUTPATIENT)
Dept: HEALTH INFORMATION MANAGEMENT | Facility: OTHER | Age: 70
End: 2025-06-13
Payer: MEDICARE

## 2025-06-21 ENCOUNTER — APPOINTMENT (OUTPATIENT)
Dept: LAB | Facility: MEDICAL CENTER | Age: 70
End: 2025-06-21
Payer: MEDICARE

## 2025-06-21 DIAGNOSIS — E55.9 VITAMIN D DEFICIENCY: ICD-10-CM

## 2025-06-21 DIAGNOSIS — J45.30 MILD PERSISTENT ASTHMA WITHOUT COMPLICATION: ICD-10-CM

## 2025-06-21 DIAGNOSIS — E78.00 ELEVATED LDL CHOLESTEROL LEVEL: ICD-10-CM

## 2025-06-21 DIAGNOSIS — R73.09 ELEVATED GLUCOSE: ICD-10-CM

## 2025-06-21 LAB
25(OH)D3 SERPL-MCNC: 48 NG/ML (ref 30–100)
ALBUMIN SERPL BCP-MCNC: 4.3 G/DL (ref 3.2–4.9)
ALBUMIN/GLOB SERPL: 1.6 G/DL
ALP SERPL-CCNC: 78 U/L (ref 30–99)
ALT SERPL-CCNC: 22 U/L (ref 2–50)
ANION GAP SERPL CALC-SCNC: 11 MMOL/L (ref 7–16)
AST SERPL-CCNC: 22 U/L (ref 12–45)
BASOPHILS # BLD AUTO: 0.9 % (ref 0–1.8)
BASOPHILS # BLD: 0.04 K/UL (ref 0–0.12)
BILIRUB SERPL-MCNC: 0.8 MG/DL (ref 0.1–1.5)
BUN SERPL-MCNC: 21 MG/DL (ref 8–22)
CALCIUM ALBUM COR SERPL-MCNC: 9.6 MG/DL (ref 8.5–10.5)
CALCIUM SERPL-MCNC: 9.8 MG/DL (ref 8.5–10.5)
CHLORIDE SERPL-SCNC: 107 MMOL/L (ref 96–112)
CHOLEST SERPL-MCNC: 215 MG/DL (ref 100–199)
CO2 SERPL-SCNC: 23 MMOL/L (ref 20–33)
CREAT SERPL-MCNC: 0.86 MG/DL (ref 0.5–1.4)
EOSINOPHIL # BLD AUTO: 0.29 K/UL (ref 0–0.51)
EOSINOPHIL NFR BLD: 6.4 % (ref 0–6.9)
ERYTHROCYTE [DISTWIDTH] IN BLOOD BY AUTOMATED COUNT: 44.9 FL (ref 35.9–50)
EST. AVERAGE GLUCOSE BLD GHB EST-MCNC: 108 MG/DL
FASTING STATUS PATIENT QL REPORTED: NORMAL
GFR SERPLBLD CREATININE-BSD FMLA CKD-EPI: 74 ML/MIN/1.73 M 2
GLOBULIN SER CALC-MCNC: 2.7 G/DL (ref 1.9–3.5)
GLUCOSE SERPL-MCNC: 85 MG/DL (ref 65–99)
HBA1C MFR BLD: 5.4 % (ref 4–5.6)
HCT VFR BLD AUTO: 46.7 % (ref 37–47)
HDLC SERPL-MCNC: 66 MG/DL
HGB BLD-MCNC: 15.3 G/DL (ref 12–16)
IMM GRANULOCYTES # BLD AUTO: 0.01 K/UL (ref 0–0.11)
IMM GRANULOCYTES NFR BLD AUTO: 0.2 % (ref 0–0.9)
LDLC SERPL CALC-MCNC: 122 MG/DL
LYMPHOCYTES # BLD AUTO: 1.97 K/UL (ref 1–4.8)
LYMPHOCYTES NFR BLD: 43.3 % (ref 22–41)
MCH RBC QN AUTO: 31.7 PG (ref 27–33)
MCHC RBC AUTO-ENTMCNC: 32.8 G/DL (ref 32.2–35.5)
MCV RBC AUTO: 96.7 FL (ref 81.4–97.8)
MONOCYTES # BLD AUTO: 0.43 K/UL (ref 0–0.85)
MONOCYTES NFR BLD AUTO: 9.5 % (ref 0–13.4)
NEUTROPHILS # BLD AUTO: 1.81 K/UL (ref 1.82–7.42)
NEUTROPHILS NFR BLD: 39.7 % (ref 44–72)
NRBC # BLD AUTO: 0 K/UL
NRBC BLD-RTO: 0 /100 WBC (ref 0–0.2)
PLATELET # BLD AUTO: 241 K/UL (ref 164–446)
PMV BLD AUTO: 10.1 FL (ref 9–12.9)
POTASSIUM SERPL-SCNC: 5.1 MMOL/L (ref 3.6–5.5)
PROT SERPL-MCNC: 7 G/DL (ref 6–8.2)
RBC # BLD AUTO: 4.83 M/UL (ref 4.2–5.4)
SODIUM SERPL-SCNC: 141 MMOL/L (ref 135–145)
TRIGL SERPL-MCNC: 137 MG/DL (ref 0–149)
TSH SERPL DL<=0.005 MIU/L-ACNC: 1.88 UIU/ML (ref 0.38–5.33)
WBC # BLD AUTO: 4.6 K/UL (ref 4.8–10.8)

## 2025-06-21 PROCEDURE — 85025 COMPLETE CBC W/AUTO DIFF WBC: CPT

## 2025-06-21 PROCEDURE — 83036 HEMOGLOBIN GLYCOSYLATED A1C: CPT

## 2025-06-21 PROCEDURE — 82306 VITAMIN D 25 HYDROXY: CPT

## 2025-06-21 PROCEDURE — 80061 LIPID PANEL: CPT

## 2025-06-21 PROCEDURE — 84443 ASSAY THYROID STIM HORMONE: CPT

## 2025-06-21 PROCEDURE — 36415 COLL VENOUS BLD VENIPUNCTURE: CPT

## 2025-06-21 PROCEDURE — 80053 COMPREHEN METABOLIC PANEL: CPT

## 2025-06-24 ENCOUNTER — RESULTS FOLLOW-UP (OUTPATIENT)
Dept: MEDICAL GROUP | Facility: LAB | Age: 70
End: 2025-06-24
Payer: MEDICARE